# Patient Record
Sex: FEMALE | Race: WHITE | NOT HISPANIC OR LATINO | Employment: FULL TIME | URBAN - METROPOLITAN AREA
[De-identification: names, ages, dates, MRNs, and addresses within clinical notes are randomized per-mention and may not be internally consistent; named-entity substitution may affect disease eponyms.]

---

## 2017-04-27 ENCOUNTER — ALLSCRIPTS OFFICE VISIT (OUTPATIENT)
Dept: OTHER | Facility: OTHER | Age: 32
End: 2017-04-27

## 2017-07-14 ENCOUNTER — ALLSCRIPTS OFFICE VISIT (OUTPATIENT)
Dept: OTHER | Facility: OTHER | Age: 32
End: 2017-07-14

## 2017-07-20 ENCOUNTER — ALLSCRIPTS OFFICE VISIT (OUTPATIENT)
Dept: OTHER | Facility: OTHER | Age: 32
End: 2017-07-20

## 2017-07-20 DIAGNOSIS — Z34.00 ENCOUNTER FOR SUPERVISION OF NORMAL FIRST PREGNANCY: ICD-10-CM

## 2017-07-20 DIAGNOSIS — Z83.49 FAMILY HISTORY OF OTHER ENDOCRINE, NUTRITIONAL AND METABOLIC DISEASES: ICD-10-CM

## 2017-08-02 ENCOUNTER — TRANSCRIBE ORDERS (OUTPATIENT)
Dept: ADMINISTRATIVE | Age: 32
End: 2017-08-02

## 2017-08-02 ENCOUNTER — APPOINTMENT (OUTPATIENT)
Dept: LAB | Age: 32
End: 2017-08-02
Payer: COMMERCIAL

## 2017-08-02 DIAGNOSIS — Z34.00 SUPERVISION OF NORMAL FIRST PREGNANCY, ANTEPARTUM: Primary | ICD-10-CM

## 2017-08-02 DIAGNOSIS — Z34.00 SUPERVISION OF NORMAL FIRST PREGNANCY, ANTEPARTUM: ICD-10-CM

## 2017-08-02 DIAGNOSIS — Z00.8 HEALTH EXAMINATION IN POPULATION SURVEY: Primary | ICD-10-CM

## 2017-08-02 DIAGNOSIS — Z34.00 ENCOUNTER FOR SUPERVISION OF NORMAL FIRST PREGNANCY: ICD-10-CM

## 2017-08-02 DIAGNOSIS — Z00.8 HEALTH EXAMINATION IN POPULATION SURVEY: ICD-10-CM

## 2017-08-02 DIAGNOSIS — Z83.49 FAMILY HISTORY OF OTHER ENDOCRINE, NUTRITIONAL AND METABOLIC DISEASES: ICD-10-CM

## 2017-08-02 LAB
ABO GROUP BLD: NORMAL
BASOPHILS # BLD AUTO: 0.01 THOUSANDS/ΜL (ref 0–0.1)
BASOPHILS NFR BLD AUTO: 0 % (ref 0–1)
BILIRUB UR QL STRIP: NEGATIVE
BLD GP AB SCN SERPL QL: NEGATIVE
CHOLEST SERPL-MCNC: 188 MG/DL (ref 50–200)
CLARITY UR: NORMAL
COLOR UR: YELLOW
EOSINOPHIL # BLD AUTO: 0.07 THOUSAND/ΜL (ref 0–0.61)
EOSINOPHIL NFR BLD AUTO: 1 % (ref 0–6)
ERYTHROCYTE [DISTWIDTH] IN BLOOD BY AUTOMATED COUNT: 13.2 % (ref 11.6–15.1)
EST. AVERAGE GLUCOSE BLD GHB EST-MCNC: 97 MG/DL
GLUCOSE UR STRIP-MCNC: NEGATIVE MG/DL
HBA1C MFR BLD: 5 % (ref 4.2–6.3)
HBV SURFACE AG SER QL: NORMAL
HCT VFR BLD AUTO: 40.3 % (ref 34.8–46.1)
HDLC SERPL-MCNC: 59 MG/DL (ref 40–60)
HGB BLD-MCNC: 13.8 G/DL (ref 11.5–15.4)
HGB UR QL STRIP.AUTO: NEGATIVE
KETONES UR STRIP-MCNC: NEGATIVE MG/DL
LDLC SERPL CALC-MCNC: 105 MG/DL (ref 0–100)
LEUKOCYTE ESTERASE UR QL STRIP: NEGATIVE
LYMPHOCYTES # BLD AUTO: 1.49 THOUSANDS/ΜL (ref 0.6–4.47)
LYMPHOCYTES NFR BLD AUTO: 16 % (ref 14–44)
MCH RBC QN AUTO: 31 PG (ref 26.8–34.3)
MCHC RBC AUTO-ENTMCNC: 34.2 G/DL (ref 31.4–37.4)
MCV RBC AUTO: 91 FL (ref 82–98)
MONOCYTES # BLD AUTO: 0.63 THOUSAND/ΜL (ref 0.17–1.22)
MONOCYTES NFR BLD AUTO: 7 % (ref 4–12)
NEUTROPHILS # BLD AUTO: 7.27 THOUSANDS/ΜL (ref 1.85–7.62)
NEUTS SEG NFR BLD AUTO: 76 % (ref 43–75)
NITRITE UR QL STRIP: NEGATIVE
NRBC BLD AUTO-RTO: 0 /100 WBCS
PH UR STRIP.AUTO: 7.5 [PH] (ref 4.5–8)
PLATELET # BLD AUTO: 217 THOUSANDS/UL (ref 149–390)
PMV BLD AUTO: 10.9 FL (ref 8.9–12.7)
PROT UR STRIP-MCNC: NEGATIVE MG/DL
RBC # BLD AUTO: 4.45 MILLION/UL (ref 3.81–5.12)
RH BLD: POSITIVE
RUBV IGG SERPL IA-ACNC: >175 IU/ML
SP GR UR STRIP.AUTO: 1.02 (ref 1–1.03)
SPECIMEN EXPIRATION DATE: NORMAL
TRIGL SERPL-MCNC: 119 MG/DL
TSH SERPL DL<=0.05 MIU/L-ACNC: 2.77 UIU/ML (ref 0.36–3.74)
UROBILINOGEN UR QL STRIP.AUTO: 0.2 E.U./DL
WBC # BLD AUTO: 9.48 THOUSAND/UL (ref 4.31–10.16)

## 2017-08-02 PROCEDURE — 80081 OBSTETRIC PANEL INC HIV TSTG: CPT

## 2017-08-02 PROCEDURE — 80061 LIPID PANEL: CPT

## 2017-08-02 PROCEDURE — 83036 HEMOGLOBIN GLYCOSYLATED A1C: CPT

## 2017-08-02 PROCEDURE — 87086 URINE CULTURE/COLONY COUNT: CPT

## 2017-08-02 PROCEDURE — 36415 COLL VENOUS BLD VENIPUNCTURE: CPT

## 2017-08-02 PROCEDURE — 84443 ASSAY THYROID STIM HORMONE: CPT

## 2017-08-02 PROCEDURE — 81003 URINALYSIS AUTO W/O SCOPE: CPT

## 2017-08-03 LAB
BACTERIA UR CULT: NORMAL
HIV 1+2 AB+HIV1 P24 AG SERPL QL IA: NORMAL
RPR SER QL: NORMAL

## 2017-08-16 ENCOUNTER — LAB REQUISITION (OUTPATIENT)
Dept: LAB | Facility: HOSPITAL | Age: 32
End: 2017-08-16
Payer: COMMERCIAL

## 2017-08-16 ENCOUNTER — GENERIC CONVERSION - ENCOUNTER (OUTPATIENT)
Dept: OTHER | Facility: OTHER | Age: 32
End: 2017-08-16

## 2017-08-16 DIAGNOSIS — Z11.3 ENCOUNTER FOR SCREENING FOR INFECTIONS WITH PREDOMINANTLY SEXUAL MODE OF TRANSMISSION: ICD-10-CM

## 2017-08-16 DIAGNOSIS — Z34.90 ENCOUNTER FOR SUPERVISION OF NORMAL PREGNANCY: ICD-10-CM

## 2017-08-16 PROCEDURE — 87591 N.GONORRHOEAE DNA AMP PROB: CPT | Performed by: OBSTETRICS & GYNECOLOGY

## 2017-08-16 PROCEDURE — 87491 CHLMYD TRACH DNA AMP PROBE: CPT | Performed by: OBSTETRICS & GYNECOLOGY

## 2017-08-18 LAB
CHLAMYDIA DNA CVX QL NAA+PROBE: NORMAL
N GONORRHOEA DNA GENITAL QL NAA+PROBE: NORMAL

## 2017-08-26 ENCOUNTER — GENERIC CONVERSION - ENCOUNTER (OUTPATIENT)
Dept: OTHER | Facility: OTHER | Age: 32
End: 2017-08-26

## 2017-09-08 ENCOUNTER — ALLSCRIPTS OFFICE VISIT (OUTPATIENT)
Dept: PERINATAL CARE | Facility: CLINIC | Age: 32
End: 2017-09-08
Payer: COMMERCIAL

## 2017-09-08 ENCOUNTER — GENERIC CONVERSION - ENCOUNTER (OUTPATIENT)
Dept: OTHER | Facility: OTHER | Age: 32
End: 2017-09-08

## 2017-09-08 PROCEDURE — 76817 TRANSVAGINAL US OBSTETRIC: CPT | Performed by: OBSTETRICS & GYNECOLOGY

## 2017-09-08 PROCEDURE — 76811 OB US DETAILED SNGL FETUS: CPT | Performed by: OBSTETRICS & GYNECOLOGY

## 2017-09-12 ENCOUNTER — GENERIC CONVERSION - ENCOUNTER (OUTPATIENT)
Dept: OTHER | Facility: OTHER | Age: 32
End: 2017-09-12

## 2017-09-26 ENCOUNTER — GENERIC CONVERSION - ENCOUNTER (OUTPATIENT)
Dept: OTHER | Facility: OTHER | Age: 32
End: 2017-09-26

## 2017-10-10 ENCOUNTER — ALLSCRIPTS OFFICE VISIT (OUTPATIENT)
Dept: OTHER | Facility: OTHER | Age: 32
End: 2017-10-10

## 2017-10-10 DIAGNOSIS — R73.02 IMPAIRED GLUCOSE TOLERANCE: ICD-10-CM

## 2017-10-10 DIAGNOSIS — Z34.82 ENCOUNTER FOR SUPERVISION OF OTHER NORMAL PREGNANCY, SECOND TRIMESTER: ICD-10-CM

## 2017-11-04 ENCOUNTER — TRANSCRIBE ORDERS (OUTPATIENT)
Dept: ADMINISTRATIVE | Age: 32
End: 2017-11-04

## 2017-11-04 ENCOUNTER — APPOINTMENT (OUTPATIENT)
Dept: LAB | Age: 32
End: 2017-11-04
Payer: COMMERCIAL

## 2017-11-04 DIAGNOSIS — Z34.82 ENCOUNTER FOR SUPERVISION OF OTHER NORMAL PREGNANCY, SECOND TRIMESTER: ICD-10-CM

## 2017-11-04 LAB
ERYTHROCYTE [DISTWIDTH] IN BLOOD BY AUTOMATED COUNT: 13.6 % (ref 11.6–15.1)
GLUCOSE 1H P 50 G GLC PO SERPL-MCNC: 138 MG/DL
HCT VFR BLD AUTO: 39.2 % (ref 34.8–46.1)
HGB BLD-MCNC: 13 G/DL (ref 11.5–15.4)
MCH RBC QN AUTO: 30.5 PG (ref 26.8–34.3)
MCHC RBC AUTO-ENTMCNC: 33.2 G/DL (ref 31.4–37.4)
MCV RBC AUTO: 92 FL (ref 82–98)
PLATELET # BLD AUTO: 226 THOUSANDS/UL (ref 149–390)
PMV BLD AUTO: 11.5 FL (ref 8.9–12.7)
RBC # BLD AUTO: 4.26 MILLION/UL (ref 3.81–5.12)
WBC # BLD AUTO: 10.26 THOUSAND/UL (ref 4.31–10.16)

## 2017-11-04 PROCEDURE — 85027 COMPLETE CBC AUTOMATED: CPT

## 2017-11-04 PROCEDURE — 82950 GLUCOSE TEST: CPT

## 2017-11-04 PROCEDURE — 86592 SYPHILIS TEST NON-TREP QUAL: CPT

## 2017-11-04 PROCEDURE — 36415 COLL VENOUS BLD VENIPUNCTURE: CPT

## 2017-11-06 ENCOUNTER — GENERIC CONVERSION - ENCOUNTER (OUTPATIENT)
Dept: OTHER | Facility: OTHER | Age: 32
End: 2017-11-06

## 2017-11-06 LAB — RPR SER QL: NORMAL

## 2017-11-07 ENCOUNTER — GENERIC CONVERSION - ENCOUNTER (OUTPATIENT)
Dept: OTHER | Facility: OTHER | Age: 32
End: 2017-11-07

## 2017-11-11 ENCOUNTER — APPOINTMENT (OUTPATIENT)
Dept: LAB | Facility: HOSPITAL | Age: 32
End: 2017-11-11
Attending: OBSTETRICS & GYNECOLOGY
Payer: COMMERCIAL

## 2017-11-11 DIAGNOSIS — R73.02 IMPAIRED GLUCOSE TOLERANCE: ICD-10-CM

## 2017-11-11 LAB
GLUCOSE 1H P 100 G GLC PO SERPL-MCNC: 133 MG/DL (ref 65–179)
GLUCOSE 2H P 100 G GLC PO SERPL-MCNC: 129 MG/DL (ref 65–154)
GLUCOSE 3H P 100 G GLC PO SERPL-MCNC: 92 MG/DL (ref 65–139)
GLUCOSE P FAST SERPL-MCNC: 81 MG/DL (ref 65–99)

## 2017-11-11 PROCEDURE — 36415 COLL VENOUS BLD VENIPUNCTURE: CPT

## 2017-11-11 PROCEDURE — 82952 GTT-ADDED SAMPLES: CPT

## 2017-11-11 PROCEDURE — 82951 GLUCOSE TOLERANCE TEST (GTT): CPT

## 2017-11-13 ENCOUNTER — GENERIC CONVERSION - ENCOUNTER (OUTPATIENT)
Dept: OTHER | Facility: OTHER | Age: 32
End: 2017-11-13

## 2017-11-17 ENCOUNTER — GENERIC CONVERSION - ENCOUNTER (OUTPATIENT)
Dept: OTHER | Facility: OTHER | Age: 32
End: 2017-11-17

## 2017-12-13 ENCOUNTER — GENERIC CONVERSION - ENCOUNTER (OUTPATIENT)
Dept: OTHER | Facility: OTHER | Age: 32
End: 2017-12-13

## 2017-12-27 ENCOUNTER — GENERIC CONVERSION - ENCOUNTER (OUTPATIENT)
Dept: OTHER | Facility: OTHER | Age: 32
End: 2017-12-27

## 2017-12-27 ENCOUNTER — LAB REQUISITION (OUTPATIENT)
Dept: LAB | Facility: HOSPITAL | Age: 32
End: 2017-12-27
Payer: COMMERCIAL

## 2017-12-27 DIAGNOSIS — Z34.90 ENCOUNTER FOR SUPERVISION OF NORMAL PREGNANCY: ICD-10-CM

## 2017-12-27 PROCEDURE — 87653 STREP B DNA AMP PROBE: CPT | Performed by: PHYSICIAN ASSISTANT

## 2017-12-28 ENCOUNTER — OB ABSTRACT (OUTPATIENT)
Dept: LABOR AND DELIVERY | Facility: HOSPITAL | Age: 32
End: 2017-12-28

## 2017-12-28 PROBLEM — O26.859 SPOTTING IN PREGNANCY: Status: ACTIVE | Noted: 2017-12-28

## 2017-12-28 PROBLEM — R73.09 ABNORMAL GLUCOSE TOLERANCE TEST: Status: ACTIVE | Noted: 2017-12-28

## 2017-12-28 PROBLEM — Z34.82 ENCOUNTER FOR SUPERVISION OF OTHER NORMAL PREGNANCY, SECOND TRIMESTER: Status: ACTIVE | Noted: 2017-12-28

## 2017-12-29 LAB — GP B STREP DNA SPEC QL NAA+PROBE: NORMAL

## 2018-01-05 ENCOUNTER — GENERIC CONVERSION - ENCOUNTER (OUTPATIENT)
Dept: OTHER | Facility: OTHER | Age: 33
End: 2018-01-05

## 2018-01-09 ENCOUNTER — GENERIC CONVERSION - ENCOUNTER (OUTPATIENT)
Dept: OTHER | Facility: OTHER | Age: 33
End: 2018-01-09

## 2018-01-09 ENCOUNTER — APPOINTMENT (OUTPATIENT)
Dept: PERINATAL CARE | Facility: CLINIC | Age: 33
End: 2018-01-09
Payer: COMMERCIAL

## 2018-01-09 PROCEDURE — 76816 OB US FOLLOW-UP PER FETUS: CPT | Performed by: OBSTETRICS & GYNECOLOGY

## 2018-01-09 NOTE — PROGRESS NOTES
SEP 8 2017         RE: Juan Nolan                                 To: [facility]  MR#: [id number]                                   [address]   : AUG [address]   ENC: [id number]                             Lokesh, [address]   (Exam #: [id number])                           Fax: [phone]      The LMP of this 29year old,  G1, P0-0-0-0 patient was 2017, her   working CHON is 2018 and the current gestational age is 25 weeks 1   day by  [facility] [address]  A sonographic examination was performed on SEP   8 2017 using real time equipment  The ultrasound examination was performed   using abdominal & vaginal techniques  The patient has a BMI of 31 7  Her   blood pressure today was 108/61  Haris Hess presents today for a fetal anatomic evaluation  This is her first   pregnancy  She has no significant medical history  Her surgical history   is significant for wisdom teeth removal   She denies the current use of   tobacco, alcohol, or drugs  She currently takes a multivitamin, folic   acid, and Zyrtec when necessary  She has no significant drug allergies  Her family medical history is remarkable for thyroid disease in her family   and deafness in her great grandparents  A review of systems is otherwise   negative  On exam, the patient appears well, in no acute distress, and her   abdomen is nontender        Cardiac motion was observed at 134 bpm       INDICATIONS      fetal anatomical survey      Exam Types      Transvaginal   LEVEL II      RESULTS      Fetus # 1 of 1   Breech presentation   Fetal growth appeared normal   Placenta Location = Anterior   No placenta previa   Placenta Grade = I      MEASUREMENTS (* Included In Average GA)      AC              14 7 cm        19 weeks 5 days* (45%)   BPD              4 5 cm        19 weeks 4 days* (41%)   HC              16 7 cm        19 weeks 2 days* (27%)   Femur 2 9 cm        19 weeks 1 day * (21%)      Nuchal Fold      3 9 mm      Humerus          3 0 cm        19 weeks 6 days  (50%)      Cerebellum       2 0 cm        20 weeks 1 day   Biorbit          3 0 cm        19 weeks 4 days   CisternaMagna    4 4 mm      HC/AC           1 14   FL/AC           0 20   FL/BPD          0 65   EFW (Ac/Fl/Hc)   294 grams - 0 lbs 10 oz      THE AVERAGE GESTATIONAL AGE is 19 weeks 3 days +/- 10 days  AMNIOTIC FLUID         Largest Vertical Pocket = 4 4 cm   Amniotic Fluid: Normal      CERVICAL EVALUATION      SUPINE      Cervical Length: 3 90 cm      OTHER TEST RESULTS           Funneling?: No             Dynamic Changes?: No        Resp  To TFP?: No      ANATOMY      Head                                    Normal   Face/Neck                               Normal   Th  Cav  Normal   Heart                                   Normal   Abd  Cav  Normal   Stomach                                 Normal   Right Kidney                            Normal   Left Kidney                             Normal   Bladder                                 Normal   Abd  Wall                               Normal   Spine                                   Normal   Extrems                                 Normal   Genitalia                               Normal   Placenta                                Normal   Umbl  Cord                              Normal   Uterus                                  Normal   PCI                                     Normal      ANATOMY DETAILS      Visualized Appearing Sonographically Normal:   HEAD: (Calvarium, BPD Level, Cavum, Lateral Ventricles, Choroid Plexus,   Cerebellum, Cisterna Magna);    FACE/NECK: (Neck, Nuchal Fold, Profile,   Orbits, Nose/Lips, Palate, Face);    TH  CAV  : (Lungs, Diaphragm);       HEART: (Four Chamber View, Proximal Left Outflow, Proximal Right Outflow,   3VV, 3 Vessel Trachea, Short Nardin of Greater Vessels, Ductal Arch, Aortic   Arch, Interventricular Septum, Interatrial Septum, IVC, SVC, Cardiac Axis,   Cardiac Position);    ABD  CAV : (Liver);    STOMACH, RIGHT KIDNEY, LEFT   KIDNEY, BLADDER, ABD  WALL, SPINE: (Cervical Spine, Thoracic Spine, Lumbar   Spine, Sacrum);    EXTREMS: (Lt Humerus, Rt Humerus, Lt Forearm, Rt   Forearm, Lt Hand, Rt Hand, Lt Femur, Rt Femur, Lt Low Leg, Rt Low Leg, Lt   Foot, Rt Foot);    GENITALIA, PLACENTA, UMBL  CORD, UTERUS, PCI      ADNEXA      The left ovary appeared normal and measured 2 2 x 2 1 x 1 9 cm with a   volume of 4 6 cc  The right ovary appeared normal and measured 3 6 x 2 2 x   1 5 cm with a volume of 6 2 cc  IMPRESSION      Ybarra IUP   19 weeks and 3 days by this ultrasound  (CHON=JAN 30 2018)   20 weeks and 1 day by 1st Tri Sono  (CHON=JAN 25 2018)   Breech presentation   Fetal growth appeared normal   Normal anatomy survey   Regular fetal heart rate of 134 bpm   Anterior placenta   No placenta previa      GENERAL COMMENT         The patient has declined genetic screening, and we discussed that a normal   ultrasound does not exclude all congenital birth defects or karyotypic   abnormalities  The fetal anatomic survey is complete  There is no sonographic evidence   of fetal abnormalities at this time  Good fetal movement and tone are   seen  The amniotic fluid volume appears normal   The placenta is anterior   and it appears sonographically normal   A transvaginal ultrasound was   performed to assess the cervix, which was not seen well transabdominally  The cervical length was 3 9 centimeters, which is normal for the current   gestational age  There was no significant funneling or dynamic changes   appreciated  The patient was informed of today's findings and all of her   questions were answered  The limitations of ultrasound were reviewed with   the patient, which she accepts        Recommend further ultrasound as clinically indicated  Please note, in addition to the time spent discussing the results of the   ultrasound, I spent approximately 10 minutes of face-to-face time with the   patient, greater than 50% of which was spent in counseling and the   coordination of care for this patient  Thank you very much for allowing us to participate in the care of this   very nice patient  Should you have any questions, please do not hesitate   to contact our office  Front Flip, ANA Engle     Electronically signed 09/08/17 12:15

## 2018-01-10 ENCOUNTER — ANESTHESIA EVENT (INPATIENT)
Dept: LABOR AND DELIVERY | Facility: HOSPITAL | Age: 33
End: 2018-01-10
Payer: COMMERCIAL

## 2018-01-10 ENCOUNTER — ANESTHESIA (INPATIENT)
Dept: LABOR AND DELIVERY | Facility: HOSPITAL | Age: 33
End: 2018-01-10
Payer: COMMERCIAL

## 2018-01-10 ENCOUNTER — HOSPITAL ENCOUNTER (INPATIENT)
Facility: HOSPITAL | Age: 33
LOS: 3 days | Discharge: HOME | End: 2018-01-13
Attending: OBSTETRICS & GYNECOLOGY | Admitting: OBSTETRICS & GYNECOLOGY
Payer: COMMERCIAL

## 2018-01-10 DIAGNOSIS — E66.09 CLASS 2 OBESITY DUE TO EXCESS CALORIES WITHOUT SERIOUS COMORBIDITY IN ADULT: ICD-10-CM

## 2018-01-10 DIAGNOSIS — O42.90 PREMATURE RUPTURE OF MEMBRANES: ICD-10-CM

## 2018-01-10 DIAGNOSIS — Z3A.37 37 WEEKS GESTATION OF PREGNANCY: ICD-10-CM

## 2018-01-10 PROBLEM — O26.859 SPOTTING IN PREGNANCY: Status: RESOLVED | Noted: 2017-12-28 | Resolved: 2018-01-10

## 2018-01-10 PROBLEM — Z34.82 ENCOUNTER FOR SUPERVISION OF OTHER NORMAL PREGNANCY, SECOND TRIMESTER: Status: RESOLVED | Noted: 2017-12-28 | Resolved: 2018-01-10

## 2018-01-10 PROBLEM — R73.09 ABNORMAL GLUCOSE TOLERANCE TEST: Status: RESOLVED | Noted: 2017-12-28 | Resolved: 2018-01-10

## 2018-01-10 LAB
ABO GROUP BLD: NORMAL
BASOPHILS # BLD AUTO: 0.01 THOUSANDS/ΜL (ref 0–0.1)
BASOPHILS NFR BLD AUTO: 0 % (ref 0–1)
BLD GP AB SCN SERPL QL: NEGATIVE
EOSINOPHIL # BLD AUTO: 0.04 THOUSAND/ΜL (ref 0–0.61)
EOSINOPHIL NFR BLD AUTO: 0 % (ref 0–6)
ERYTHROCYTE [DISTWIDTH] IN BLOOD BY AUTOMATED COUNT: 13.8 % (ref 11.6–15.1)
HCT VFR BLD AUTO: 36.3 % (ref 34.8–46.1)
HGB BLD-MCNC: 12.5 G/DL (ref 11.5–15.4)
LYMPHOCYTES # BLD AUTO: 1.28 THOUSANDS/ΜL (ref 0.6–4.47)
LYMPHOCYTES NFR BLD AUTO: 14 % (ref 14–44)
MCH RBC QN AUTO: 29.8 PG (ref 26.8–34.3)
MCHC RBC AUTO-ENTMCNC: 34.4 G/DL (ref 31.4–37.4)
MCV RBC AUTO: 86 FL (ref 82–98)
MONOCYTES # BLD AUTO: 0.73 THOUSAND/ΜL (ref 0.17–1.22)
MONOCYTES NFR BLD AUTO: 8 % (ref 4–12)
NEUTROPHILS # BLD AUTO: 7.03 THOUSANDS/ΜL (ref 1.85–7.62)
NEUTS SEG NFR BLD AUTO: 78 % (ref 43–75)
NRBC BLD AUTO-RTO: 0 /100 WBCS
PLATELET # BLD AUTO: 217 THOUSANDS/UL (ref 149–390)
PMV BLD AUTO: 11.3 FL (ref 8.9–12.7)
RBC # BLD AUTO: 4.2 MILLION/UL (ref 3.81–5.12)
RH BLD: POSITIVE
SPECIMEN EXPIRATION DATE: NORMAL
WBC # BLD AUTO: 9.1 THOUSAND/UL (ref 4.31–10.16)

## 2018-01-10 PROCEDURE — 4A1HXCZ MONITORING OF PRODUCTS OF CONCEPTION, CARDIAC RATE, EXTERNAL APPROACH: ICD-10-PCS | Performed by: OBSTETRICS & GYNECOLOGY

## 2018-01-10 PROCEDURE — 86900 BLOOD TYPING SEROLOGIC ABO: CPT | Performed by: OBSTETRICS & GYNECOLOGY

## 2018-01-10 PROCEDURE — 86850 RBC ANTIBODY SCREEN: CPT | Performed by: OBSTETRICS & GYNECOLOGY

## 2018-01-10 PROCEDURE — 86901 BLOOD TYPING SEROLOGIC RH(D): CPT | Performed by: OBSTETRICS & GYNECOLOGY

## 2018-01-10 PROCEDURE — 86592 SYPHILIS TEST NON-TREP QUAL: CPT | Performed by: OBSTETRICS & GYNECOLOGY

## 2018-01-10 PROCEDURE — 85025 COMPLETE CBC W/AUTO DIFF WBC: CPT | Performed by: OBSTETRICS & GYNECOLOGY

## 2018-01-10 PROCEDURE — 99214 OFFICE O/P EST MOD 30 MIN: CPT

## 2018-01-10 PROCEDURE — 0KQM0ZZ REPAIR PERINEUM MUSCLE, OPEN APPROACH: ICD-10-PCS | Performed by: OBSTETRICS & GYNECOLOGY

## 2018-01-10 RX ORDER — BUPIVACAINE HYDROCHLORIDE 2.5 MG/ML
INJECTION, SOLUTION INFILTRATION; PERINEURAL AS NEEDED
Status: DISCONTINUED | OUTPATIENT
Start: 2018-01-10 | End: 2018-01-11 | Stop reason: SURG

## 2018-01-10 RX ORDER — FOLIC ACID 1 MG/1
1 TABLET ORAL DAILY
Status: DISCONTINUED | OUTPATIENT
Start: 2018-01-10 | End: 2018-01-11

## 2018-01-10 RX ORDER — METOCLOPRAMIDE HYDROCHLORIDE 5 MG/ML
5 INJECTION INTRAMUSCULAR; INTRAVENOUS EVERY 6 HOURS PRN
Status: DISCONTINUED | OUTPATIENT
Start: 2018-01-10 | End: 2018-01-11

## 2018-01-10 RX ORDER — FENTANYL CITRATE 50 UG/ML
INJECTION, SOLUTION INTRAMUSCULAR; INTRAVENOUS
Status: COMPLETED
Start: 2018-01-10 | End: 2018-01-10

## 2018-01-10 RX ORDER — ONDANSETRON 2 MG/ML
4 INJECTION INTRAMUSCULAR; INTRAVENOUS EVERY 4 HOURS PRN
Status: DISCONTINUED | OUTPATIENT
Start: 2018-01-10 | End: 2018-01-11

## 2018-01-10 RX ORDER — DIPHENHYDRAMINE HYDROCHLORIDE 50 MG/ML
25 INJECTION INTRAMUSCULAR; INTRAVENOUS EVERY 6 HOURS PRN
Status: DISCONTINUED | OUTPATIENT
Start: 2018-01-10 | End: 2018-01-11

## 2018-01-10 RX ORDER — FENTANYL CITRATE 50 UG/ML
INJECTION, SOLUTION INTRAMUSCULAR; INTRAVENOUS AS NEEDED
Status: DISCONTINUED | OUTPATIENT
Start: 2018-01-10 | End: 2018-01-11 | Stop reason: SURG

## 2018-01-10 RX ORDER — SODIUM CHLORIDE, SODIUM LACTATE, POTASSIUM CHLORIDE, CALCIUM CHLORIDE 600; 310; 30; 20 MG/100ML; MG/100ML; MG/100ML; MG/100ML
125 INJECTION, SOLUTION INTRAVENOUS CONTINUOUS
Status: DISCONTINUED | OUTPATIENT
Start: 2018-01-10 | End: 2018-01-11

## 2018-01-10 RX ORDER — OXYTOCIN/RINGER'S LACTATE 30/500 ML
PLASTIC BAG, INJECTION (ML) INTRAVENOUS
Status: DISPENSED
Start: 2018-01-10 | End: 2018-01-11

## 2018-01-10 RX ORDER — OXYTOCIN/RINGER'S LACTATE 30/500 ML
1-30 PLASTIC BAG, INJECTION (ML) INTRAVENOUS
Status: DISCONTINUED | OUTPATIENT
Start: 2018-01-10 | End: 2018-01-11

## 2018-01-10 RX ADMIN — SODIUM CHLORIDE, SODIUM LACTATE, POTASSIUM CHLORIDE, AND CALCIUM CHLORIDE 125 ML/HR: .6; .31; .03; .02 INJECTION, SOLUTION INTRAVENOUS at 20:35

## 2018-01-10 RX ADMIN — SODIUM CHLORIDE, SODIUM LACTATE, POTASSIUM CHLORIDE, AND CALCIUM CHLORIDE 999 ML/HR: .6; .31; .03; .02 INJECTION, SOLUTION INTRAVENOUS at 17:52

## 2018-01-10 RX ADMIN — FENTANYL CITRATE 10 MCG: 50 INJECTION, SOLUTION INTRAMUSCULAR; INTRAVENOUS at 18:11

## 2018-01-10 RX ADMIN — BUPIVACAINE HYDROCHLORIDE 1 ML: 2.5 INJECTION, SOLUTION INFILTRATION; PERINEURAL at 18:11

## 2018-01-10 RX ADMIN — SODIUM CHLORIDE, SODIUM LACTATE, POTASSIUM CHLORIDE, AND CALCIUM CHLORIDE 125 ML/HR: .6; .31; .03; .02 INJECTION, SOLUTION INTRAVENOUS at 12:00

## 2018-01-10 RX ADMIN — Medication 2 MILLI-UNITS/MIN: at 14:00

## 2018-01-10 RX ADMIN — SODIUM CHLORIDE, SODIUM LACTATE, POTASSIUM CHLORIDE, AND CALCIUM CHLORIDE 125 ML/HR: .6; .31; .03; .02 INJECTION, SOLUTION INTRAVENOUS at 23:06

## 2018-01-10 RX ADMIN — FOLIC ACID 1 MG: 1 TABLET ORAL at 14:00

## 2018-01-10 RX ADMIN — Medication 1 TABLET: at 12:22

## 2018-01-10 RX ADMIN — Medication: at 18:15

## 2018-01-10 NOTE — H&P
H&P Exam - Obstetrics   Yanet Quintana 29 y o  female MRN: [id number]  Unit/Bed#: -01 Encounter: [id number]    Assessment/Plan     Assessment:  37 weeks gestation of pregnancy  Premature rupture of membranes    Plan:  1  Admit  2  Expectant management, GBS negative    History of Present Illness   Chief Complaint: Leakage of fluid this AM    HPI:  Yanet Quintana is a 29 y o   female with an CHON of 2018, by Ultrasound at 37w6d weeks gestation who is being admitted for rupture of membranes  Contractions: mild cramping  Leakage of fluid: 530am small leak, continued an hour later, clear  No odor  Bleeding: None  Fetal movement: present  Pregnancy complications: Obesity, abnormal one hour glucola (normal 3hr)    Review of Systems   Constitutional: Negative for activity change, appetite change, chills, fatigue and fever  HENT: Negative for rhinorrhea, sneezing and sore throat  Eyes: Negative for visual disturbance  Respiratory: Negative for cough, shortness of breath and wheezing  Cardiovascular: Negative for chest pain, palpitations and leg swelling  Gastrointestinal: Negative for abdominal distention, constipation, diarrhea, nausea and vomiting  Genitourinary: Negative for difficulty urinating  Neurological: Negative for syncope and light-headedness          Historical Information   OB History    Para Term  AB Living   1 0 0 0 0 0   SAB TAB Ectopic Multiple Live Births   0 0 0 0 0      # Outcome Date GA Lbr [place]/2nd Weight Sex Delivery Anes PTL Lv   1                  Baby complications/comments: None  Past Medical History:   Diagnosis Date    Abnormal Pap smear of cervix     Chronic kidney disease     history of kidney stones, Occ UTI's    Eczema     Skin disease     atypical pre cancerous skin cells, removed at age 25    Varicella     childhood     Past Surgical History:   Procedure Laterality Date    COLPOSCOPY       Social History   History   Alcohol Use No     Comment: Socially     History   Drug Use No     History   Smoking Status    Never Smoker   Smokeless Tobacco    Never Used     Family History   Problem Relation Age of Onset    Gestational diabetes Paternal Grandmother     Hypertension Paternal Grandmother     Hypothyroidism Mother     Hypertension Maternal Grandmother     Hypothyroidism Maternal Grandmother     Cancer Maternal Grandfather     COPD Maternal Grandfather     Hypertension Maternal Grandfather     Hypothyroidism Maternal Aunt     Cancer Paternal Aunt        Meds/Allergies   All medications and allergies reviewed  No Known Allergies    Objective   Vitals: Blood pressure 119/74, pulse 75, temperature 98 4 °F (36 9 °C), temperature source Oral, resp  rate 16, height 5' 1" (1 549 m), weight 86 2 kg (190 lb), last menstrual period 04/28/2017, unknown if currently breastfeeding  Body mass index is 35 9 kg/m²  Invasive Devices          No matching active lines, drains, or airways          Physical Exam   Constitutional: She is oriented to person, place, and time  She appears well-developed and well-nourished  No distress  Cardiovascular: Normal rate, regular rhythm and normal heart sounds  Exam reveals no gallop and no friction rub  No murmur heard  Pulmonary/Chest: Effort normal and breath sounds normal  No respiratory distress  Abdominal: Soft  Bowel sounds are normal  She exhibits no distension  There is no tenderness  There is no rebound and no guarding  Neurological: She is alert and oriented to person, place, and time  She has normal reflexes  Skin: She is not diaphoretic  Cervix 3/80/0 mid soft  NST CAT 1  TOCO q 4-5min    Prenatal Labs: I have personally reviewed pertinent reports  Blood type A+_  Glucola 138 (3 hr 81, 133,129,92)  HIV neg  RPR NR  Rubella Immune  GBS Negative    Imaging, Pathology, and Other Studies: I have personally reviewed pertinent reports

## 2018-01-10 NOTE — ANESTHESIA PROCEDURE NOTES
CSE Block    Patient location during procedure: OB  Start time: 1/10/2018 6:11 PM  Reason for block: procedure for pain and at surgeon's request  Staffing  Anesthesiologist: Anny Hubbard  Performed: anesthesiologist   Preanesthetic Checklist  Completed: patient identified, site marked, surgical consent, pre-op evaluation, timeout performed, IV checked, risks and benefits discussed and monitors and equipment checked  CSE  Patient position: sitting  Prep: ChloraPrep  Patient monitoring: cardiac monitor and continuous pulse ox  Approach: midline  Spinal Needle  Needle type: pencil-tip   Needle gauge: 27 G  Needle length: 10 cm  Epidural Needle  Injection technique: BERNIE saline  Needle type: Tuohy   Needle gauge: 18 G  Location: lumbar (1-5)  Catheter  Catheter type: side hole  Catheter size: 20 G  Catheter at skin depth: 12 cm  Test dose: negative  AssessmentInjection Assessment:  negative aspiration for heme, no paresthesia on injection, positive aspiration for clear CSF and no pain on injection

## 2018-01-10 NOTE — ANESTHESIA PREPROCEDURE EVALUATION
Review of Systems/Medical History  Patient summary reviewed  Chart reviewed      Cardiovascular  Negative cardio ROS    Pulmonary  Negative pulmonary ROS ,        GI/Hepatic  Negative GI/hepatic ROS          Kidney stones,        Endo/Other  Negative endo/other ROS      GYN  Currently pregnant , Prior pregnancy/OB history : 1 Parity: 0,          Hematology  Negative hematology ROS      Musculoskeletal  Obesity ,        Neurology  Negative neurology ROS      Psychology   Negative psychology ROS            Physical Exam    Airway    Mallampati score: II  TM Distance: >3 FB  Neck ROM: full     Dental   No notable dental hx     Cardiovascular  Comment: Negative ROS, Rhythm: regular, Cardiovascular exam normal    Pulmonary  Pulmonary exam normal Breath sounds clear to auscultation,     Other Findings        Anesthesia Plan  ASA Score- 2     Anesthesia Type- epidural and spinal with ASA Monitors  Additional Monitors:   Airway Plan:         Plan Factors-    Induction-     Postoperative Plan-     Informed Consent- Anesthetic plan and risks discussed with patient  Recent labs personally reviewed:  Lab Results   Component Value Date    WBC 9 10 01/10/2018    HGB 12 5 01/10/2018     01/10/2018     No results found for: NA, K, BUN, CREATININE, GLUCOSE  No results found for: PTT   No results found for: INR    Blood type A    Lab Results   Component Value Date    HGBA1C 5 0 2017       I, Carmen Pepe MD, have personally seen and evaluated the patient prior to anesthetic care  I have reviewed the pre-anesthetic record, and other medical records if appropriate to the anesthetic care  If a CRNA is involved in the case, I have reviewed the CRNA assessment, if present, and agree  Risks/benefits and alternatives discussed with patient including possible PONV, sore throat, and possibility of rare anesthetic and surgical emergencies

## 2018-01-10 NOTE — OB LABOR/OXYTOCIN SAFETY PROGRESS
Oxytocin Safety Progress Check Note - Erica Guevara 29 y o  female MRN: [id number]    Unit/Bed#: -01 Encounter: [id number]    Obstetric History       T0      L0     SAB0   TAB0   Ectopic0   Multiple0   Live Births0      Gestational Age: 37w6d  Dose (orlin-units/min) Oxytocin: 6 orlin-units/min  Contraction Frequency (minutes): 2-3  Contraction Quality: Moderate  Tachysystole: No   Dilation: 3        Effacement (%): 80  Station: 1  Baseline Rate: 135 bpm  Fetal Heart Rate: 132 BPM  FHR Category: Category I          Notes/comments: Continue present management            Chinedu Resendez MD 1/10/2018 3:38 PM

## 2018-01-10 NOTE — OB LABOR/OXYTOCIN SAFETY PROGRESS
Oxytocin Safety Progress Check Note - Alicia Jolley 29 y o  female MRN: [id number]    Unit/Bed#: -01 Encounter: [id number]    Obstetric History       T0      L0     SAB0   TAB0   Ectopic0   Multiple0   Live Births0      Gestational Age: 41w10d  Dose (orlin-units/min) Oxytocin: 10 orlin-units/min  Contraction Frequency (minutes): 1-2  Contraction Quality: Moderate  Tachysystole: No   Dilation: 4        Effacement (%): 80  Station: 1  Baseline Rate: 130 bpm  Fetal Heart Rate: 160 BPM  FHR Category: Category I     Oxytocin Safety Progress Check: Safety check completed    Notes/comments:   Patient requesting epidural  SVE 4/80/+1  Category I FHT  Pitocin at 10  Recheck in 2 hours, sooner if indicated   OK for epidural    D/W Dr Michelet Gotti MD 1/10/2018 5:42 PM

## 2018-01-10 NOTE — OB LABOR/OXYTOCIN SAFETY PROGRESS
Labor Progress Note - Kaela Hughes 29 y o  female MRN: [id number]    Unit/Bed#: -01 Encounter: [id number]    Obstetric History       T0      L0     SAB0   TAB0   Ectopic0   Multiple0   Live Births0      Gestational Age: 41w10d     Contraction Frequency (minutes): 1-3  Contraction Quality: Mild  Tachysystole: No   Dilation: 3        Effacement (%): 80  Station: 0  Baseline Rate: 140 bpm  FHR Category: Category I     Notes/comments:   Patient remains comfortable- not feeling contractions  No cervical change at this time  FHT remains cat I with mild CTX Q 1-3 minutes  Will begin pitocin titration 2x2 Q 30 minutes per nursing protocol and monitor closely  Discussed risks, benefits and alternatives with augmentation to patient and she is agreeable      D/w Dr Tori Myers MD 1/10/2018 1:43 PM

## 2018-01-11 LAB
BASE EXCESS BLDCOA CALC-SCNC: -5.8 MMOL/L (ref 3–11)
BASE EXCESS BLDCOV CALC-SCNC: -5.5 MMOL/L (ref 1–9)
HCO3 BLDCOA-SCNC: 20.9 MMOL/L (ref 17.3–27.3)
HCO3 BLDCOV-SCNC: 18 MMOL/L (ref 12.2–28.6)
O2 CT VFR BLDCOA CALC: 15.6 ML/DL
OXYHGB MFR BLDCOA: 69.6 %
OXYHGB MFR BLDCOV: 79.7 %
PCO2 BLDCOA: 45.3 MM[HG] (ref 30–60)
PCO2 BLDCOV: 30.5 MM HG (ref 27–43)
PH BLDCOA: 7.28 [PH] (ref 7.23–7.43)
PH BLDCOV: 7.39 [PH] (ref 7.19–7.49)
PO2 BLDCOA: 34.4 MM HG (ref 5–25)
PO2 BLDCOV: 36.5 MM HG (ref 15–45)
RPR SER QL: NORMAL
SAO2 % BLDCOV: 17.7 ML/DL

## 2018-01-11 PROCEDURE — 82805 BLOOD GASES W/O2 SATURATION: CPT | Performed by: OBSTETRICS & GYNECOLOGY

## 2018-01-11 RX ORDER — ACETAMINOPHEN 325 MG/1
650 TABLET ORAL ONCE
Status: COMPLETED | OUTPATIENT
Start: 2018-01-11 | End: 2018-01-11

## 2018-01-11 RX ORDER — DIAPER,BRIEF,INFANT-TODD,DISP
1 EACH MISCELLANEOUS AS NEEDED
Status: DISCONTINUED | OUTPATIENT
Start: 2018-01-11 | End: 2018-01-13 | Stop reason: HOSPADM

## 2018-01-11 RX ORDER — ONDANSETRON 2 MG/ML
4 INJECTION INTRAMUSCULAR; INTRAVENOUS EVERY 8 HOURS PRN
Status: DISCONTINUED | OUTPATIENT
Start: 2018-01-11 | End: 2018-01-13 | Stop reason: HOSPADM

## 2018-01-11 RX ORDER — IBUPROFEN 600 MG/1
600 TABLET ORAL EVERY 6 HOURS PRN
Status: DISCONTINUED | OUTPATIENT
Start: 2018-01-11 | End: 2018-01-13 | Stop reason: HOSPADM

## 2018-01-11 RX ORDER — DIPHENHYDRAMINE HCL 25 MG
25 TABLET ORAL EVERY 6 HOURS PRN
Status: DISCONTINUED | OUTPATIENT
Start: 2018-01-11 | End: 2018-01-13 | Stop reason: HOSPADM

## 2018-01-11 RX ORDER — CALCIUM CARBONATE 200(500)MG
1000 TABLET,CHEWABLE ORAL DAILY PRN
Status: DISCONTINUED | OUTPATIENT
Start: 2018-01-11 | End: 2018-01-13 | Stop reason: HOSPADM

## 2018-01-11 RX ORDER — ACETAMINOPHEN 325 MG/1
650 TABLET ORAL EVERY 6 HOURS PRN
Status: DISCONTINUED | OUTPATIENT
Start: 2018-01-11 | End: 2018-01-13 | Stop reason: HOSPADM

## 2018-01-11 RX ORDER — OXYCODONE HYDROCHLORIDE AND ACETAMINOPHEN 5; 325 MG/1; MG/1
1 TABLET ORAL EVERY 4 HOURS PRN
Status: DISCONTINUED | OUTPATIENT
Start: 2018-01-11 | End: 2018-01-13 | Stop reason: HOSPADM

## 2018-01-11 RX ORDER — DOCUSATE SODIUM 100 MG/1
100 CAPSULE, LIQUID FILLED ORAL 2 TIMES DAILY
Status: DISCONTINUED | OUTPATIENT
Start: 2018-01-11 | End: 2018-01-13 | Stop reason: HOSPADM

## 2018-01-11 RX ADMIN — BENZOCAINE AND MENTHOL: 20; .5 SPRAY TOPICAL at 09:47

## 2018-01-11 RX ADMIN — DOCUSATE SODIUM 100 MG: 100 CAPSULE, LIQUID FILLED ORAL at 18:30

## 2018-01-11 RX ADMIN — HYDROCORTISONE 1 APPLICATION: 1 CREAM TOPICAL at 09:47

## 2018-01-11 RX ADMIN — WITCH HAZEL 1 PAD: 500 SOLUTION RECTAL; TOPICAL at 09:47

## 2018-01-11 RX ADMIN — ACETAMINOPHEN 650 MG: 325 TABLET, FILM COATED ORAL at 02:20

## 2018-01-11 RX ADMIN — IBUPROFEN 600 MG: 600 TABLET ORAL at 09:36

## 2018-01-11 RX ADMIN — IBUPROFEN 600 MG: 600 TABLET ORAL at 20:38

## 2018-01-11 RX ADMIN — SODIUM CHLORIDE, SODIUM LACTATE, POTASSIUM CHLORIDE, AND CALCIUM CHLORIDE 1000 ML: .6; .31; .03; .02 INJECTION, SOLUTION INTRAVENOUS at 02:35

## 2018-01-11 RX ADMIN — DOCUSATE SODIUM 100 MG: 100 CAPSULE, LIQUID FILLED ORAL at 09:36

## 2018-01-11 NOTE — L&D DELIVERY NOTE
DELIVERY NOTE  Jovanny Velazquez 29 y o  female MRN: [id number]  Unit/Bed#: -01 Encounter: [id number]    Obstetrician:   Bridgette Espino    Assistant: Pablito Medina    Pre-Delivery Diagnosis: Term pregnancy ; Term PROM    Post-Delivery Diagnosis: Same as above - Delivered or 2nd degree laceration and periurethral lacerations    Procedure:  ; repair of second degree perineal laceration    Indications for instrumental delivery: none    Estimated Blood Loss:  095            Complications:  None    Description of Delivery: Patient delivered a viable Female   The fetal position was noted to be DERICK  A nuchal cord was not noted  With the assistance of maternal expulsive efforts and downward traction of fetal head, the anterior shoulder was delivered without difficulty, followed by the remainder of the infant's body  After delivery of the , the umbilical cord was doubly clamped and cut and the  was passed off to  staff for routine care  Umbilical cord blood and umbilical artery and venous gases were collected  Placenta was delivered with fundal massage and gentle traction on the cord with active management of the third stage of labor  Placenta delivered intact with a 3-vessel cord  Active management of the third stage of labor was undertaken with IV pitocin  Inspection of the perineum, vagina, labia, and urethra revealed a second degree perineal laceration left of the patient's midline that was repaired in standard fashion using 3-0 Vicryl suture  There was also noted to be a superficial area of bleeding with friable tissues on the perineum left of the midline which was repaired with a single figure of eight suture of 3-0 vicryl  There were noted to be superficial periurethral lacerations which were hemostatic and did not require repair  The lacerations showed good tissue reapproximation and hemostasis      Intrauterine examination revealed small clots at the the lower uterine segment which were removed  The uterus was palpated at the fundus and noted to be firm  Vaginal bleeding was noted to be stable  The perineal lacerations were again inspected and noted to be hemostatic  Mother and baby are currently recovering nicely in stable condition      APGARS: 9 at one minute, 9 at 5 minutes  Blood gases: Arterial pH: 7 282, Base excess: -5 8 ; Venous pH:7 390, Base excess: -5 5

## 2018-01-11 NOTE — OB LABOR/OXYTOCIN SAFETY PROGRESS
Oxytocin Safety Progress Check Note - Erica Guevara 29 y o  female MRN: [id number]    Unit/Bed#: -01 Encounter: [id number]    Obstetric History       T0      L0     SAB0   TAB0   Ectopic0   Multiple0   Live Births0      Gestational Age: 41w10d  Dose (orlin-units/min) Oxytocin: 14 orlin-units/min  Contraction Frequency (minutes): 1 5-2  Contraction Quality: Moderate  Tachysystole: Yes   Dilation: 10  Dilation Complete Date: 01/10/18  Dilation Complete Time: 2300  Effacement (%): 100  Station: 1  Baseline Rate: 140 bpm  Fetal Heart Rate: 140 BPM  FHR Category: Category II     Oxytocin Safety Progress Check: Safety check completed    Notes/comments:     SAFETY HUDDLE    TRIGGER: tachysystole    PARTICIPANTS: Peyton Sanford (charge RN), Dana (bedside RN), Naa Dejesus (bedside RN), Rachelle Greene MD (resident), Joana Llanos MD    REVIEW OF CURRENT PLAN OF CARE AND MANAGEMENT: Patient with tachysystole over the last 30 minutes  Pitocin reduced to 14 from 18  Will monitor for improvement in FHR pattern  FHR category 2 for 2 late decelerations which have now resolved        TIMELINE FOR NEXT ASSESSMENT: 30-60 minutes    ALL PARTICIPANTS IN AGREEMENT WITH PLAN OF CARE: yes    IS PERRT REQUIRED: no         Provider Name: Cass Peraza MD 1/10/2018 11:16 PM

## 2018-01-11 NOTE — MISCELLANEOUS
Message   Recorded as Task   Date: 09/26/2017 09:06 AM, Created By: Maryann Sacks   Task Name: Follow Up   Assigned To: Cristina Truong   Regarding Patient: Erica Uriarte, Status: In Progress   Comment:    Daniela Jones - 26 Sep 2017 9:06 AM     TASK CREATED  Caller: Self; General Medical Question; [phone] (Home)  Pt calling to ask if it is ok to get hair highlighted  Ok to [facility] per pt   Anni Ramon - 26 Sep 2017 9:16 AM     TASK IN PROGRESS   Anni Ramon - 26 Sep 2017 9:33 AM     TASK EDITED  MA contacted Pt via phone call today  Pt states she has a function to attend, wants to know if it is okay to have hair highlighted  MA informed Pt that MA addressed Pt's question with OB Triage RN Zeenat Mata MA further informed Pt that per KAREN Mata, Pt's hair can be highlighted using foils and/or a cap  MA informed Pt , however, that hair color should not be applied directly to scalp  Pt verbalized understanding of MA's instructions regarding getting hair highlighted during pregnancy  MA reiterated to Pt that if she has any questions/concerns, to contact office  Active Problems    1  Encounter for supervision of other normal pregnancy in second trimester (V22 1)   (Z34 82)   2  Spotting in pregnancy (649 50) (O26 859)    Current Meds   1  Daily Value Multivitamin TABS; Therapy: ([place] [facility]) to Recorded   2  Folic Acid TABS; Therapy: (Recorded:[id number]) to Recorded    Allergies    1  No Known Drug Allergies    2  No Known Environmental Allergies   3   No Known Food Allergies    Signatures   Electronically signed by : Missy Goncalves MA; Sep 26 2017  9:33AM EST                       (Author)

## 2018-01-11 NOTE — DISCHARGE SUMMARY
Discharge Summary - OB/GYN  Jovanny Velazquez 29 y o  female MRN: [id number]  Unit/Bed#: -01 Encounter: [id number]    Admission Date: 1/10/2018     Discharge Date:18    Delivery Attending: Mo Lara MD   Discharge Attending: Dr Rupa Nicole    Principal Diagnosis: Pregnancy at 37w6d    Secondary Diagnosis: Term PROM    Procedures: spontaneous vaginal delivery ; repair of second degree perineal laceration    Anesthesia: epidural    Hospital course: Jovanny Velazquez is a 29 y o   who was initially admitted on 1/10/18 at 37w6d for term PROM  Labor was induced with Pitocin  She progressed to complete cervical dilation and delivered a viable female  on 17 at 01:08  Weight 6lbs 12oz via normal spontaneous vaginal delivery  She sustained a 2nd degree perineal laceration during delivery which was adequately repaired ; she also sustained bilateral periurethral abrasions which did not require repair  Apgars were 9 (1 min) and 9 (5 min)   was transferred to  nursery  Patient tolerated the procedure well  Her post-delivery course was uncomplicated  Her postpartum pain was well controlled with oral analgesics  On day of discharge, she was ambulating and able to reasonably perform all ADLs  She was voiding and had appropriate bowel function  Pain was well controlled  She was discharged home on postpartum day #2 without complications  Patient was instructed to follow up with her OB as an outpatient and was given appropriate warnings to call provider if she develops signs of infection or uncontrolled pain  Complications: none apparent    Condition at discharge: stable     Discharge instructions/Information to patient and family:   See after visit summary for information provided to patient and family  Provisions for Follow-Up Care:  See after visit summary for information related to follow-up care and any pertinent home health orders        Disposition: See After Visit Summary for discharge disposition information  Planned Readmission: No    Discharge Medications: For a complete list of the patient's medications, please refer to her med rec

## 2018-01-11 NOTE — OB LABOR/OXYTOCIN SAFETY PROGRESS
Oxytocin Safety Progress Check Note - Rylee Tovar 29 y o  female MRN: [id number]    Unit/Bed#: -01 Encounter: [id number]    Obstetric History       T0      L0     SAB0   TAB0   Ectopic0   Multiple0   Live Births0      Gestational Age: 41w10d  Dose (orlin-units/min) Oxytocin: 14 orlin-units/min  Contraction Frequency (minutes): 2-3, irregular  Contraction Quality: Mild  Tachysystole: No   Dilation: 5-6        Effacement (%): 100  Station: 1  Baseline Rate: 135 bpm  Fetal Heart Rate: 128 BPM  FHR Category: Category II     Oxytocin Safety Progress Check: Safety check completed    Notes/comments:   Making cervical change, SVE 5-6/100/+1  Periods of Cat II FHT for nonrecurrent lates which resolve with maternal repositioning  Pitocin at 14, irregular contraction pattern  Continue to titrate Pitocin, recheck in 2 hours, sooner if indicated    Dr America Wilburn aware            Maxi Mcconnell MD 1/10/2018 8:36 PM

## 2018-01-11 NOTE — LACTATION NOTE
This note was copied from a baby's chart  CONSULT - LACTATION  Baby Girl Georgina Fraction 0 days female MRN: [id number]    [facility] Room / Bed: (N)/(N) Encounter: [id number]    Maternal Information     MOTHER:  Kimberlyn Jackson  Maternal Age: 29 y o    OB History: #: 1, Date: 18, Sex: Female, Weight: 3070 g (6 lb 12 3 oz), GA: 38w0d, Delivery: Vaginal, Spontaneous Delivery, Apgar1: 9, Apgar5: 9, Living: Living, Birth Comments: None       Lactation history:   Has patient previously breast fed: No   How long had patient previously breast fed:     Previous breast feeding complications:       Past Surgical History:   Procedure Laterality Date    COLPOSCOPY         Birth information:  YOB: 2016   Time of birth: 4:56 AM   Sex: female   Delivery type: Vaginal, Spontaneous Delivery   Birth Weight: 3070 g (6 lb 12 3 oz)   Percent of Weight Change: 0%     Gestational Age: 38w0d   [unfilled]    Assessment     Breast and nipple assessment: not assessed at this time   Assessment: sleepy and not assessed at this time  Feeding assessment: no latch  LATCH:  Latch: Audible Swallowing:     Type of Nipple:     Comfort (Breast/Nipple):     Hold (Positioning):     LATCH Score:            Feeding recommendations:  breast feed on demand     Met with mother  Provided mother with Ready, Set, Baby booklet  Discussed Skin to Skin contact an benefits to mom and baby  Talked about the delay of the first bath until baby has adjusted  Spoke about the benefits of rooming in  Feeding on cue and what that means for recognizing infant's hunger  Avoidance of pacifiers for the first month discussed  Talked about exclusive breastfeeding for the first 6 months  Positioning and latch reviewed as well as showing images of other feeding positions  Discussed the properties of a good latch in any position  Reviewed hand/manual expression  Discussed s/s that baby is getting enough milk and some s/s that breastfeeding dyad may need further help  Gave information on common concerns, what to expect the first few weeks after delivery, preparing for other caregivers, and how partners can help  Resources for support also provided  Information on hand expression given  Discussed benefits of knowing how to manually express breast including stimulating milk supply, softening nipple for latch and evacuating breast in the event of engorgement  [employer] MOB and FOB to call for assistance, questions and concerns  Extension number for inpatient lactation support provided      Yuliana Lyon RN 1/11/2018 2:20 PM

## 2018-01-12 RX ADMIN — IBUPROFEN 600 MG: 600 TABLET ORAL at 09:00

## 2018-01-12 RX ADMIN — DOCUSATE SODIUM 100 MG: 100 CAPSULE, LIQUID FILLED ORAL at 09:00

## 2018-01-12 RX ADMIN — DOCUSATE SODIUM 100 MG: 100 CAPSULE, LIQUID FILLED ORAL at 18:02

## 2018-01-12 NOTE — PLAN OF CARE
ALTERATION IN THE BREAST     Optimize infant feeding at the breast Progressing        DISCHARGE PLANNING     Discharge to home or other facility with appropriate resources Progressing        INADEQUATE LATCH, SUCK OR SWALLOW     Demonstrate ability to latch and sustain latch, audible swallowing and satiety Progressing        INFECTION - ADULT     Absence or prevention of progression during hospitalization Progressing     Absence of fever/infection during neutropenic period Progressing        Knowledge Deficit     Patient/family/caregiver demonstrates understanding of disease process, treatment plan, medications, and discharge instructions Progressing        PAIN - ADULT     Verbalizes/displays adequate comfort level or baseline comfort level Progressing        POSTPARTUM     Experiences normal postpartum course Progressing     Appropriate maternal -  bonding Progressing     Establishment of infant feeding pattern Progressing     Incision(s), wounds(s) or drain site(s) healing without S/S of infection Progressing        SAFETY ADULT     Patient will remain free of falls Progressing     Maintain or return to baseline ADL function Progressing     Maintain or return mobility status to optimal level Progressing

## 2018-01-12 NOTE — DISCHARGE INSTRUCTIONS
Vaginal Delivery   WHAT YOU NEED TO KNOW:   A vaginal delivery occurs when your baby is born through your vagina (birth canal)  DISCHARGE INSTRUCTIONS:   Seek care immediately if:   · Your leg feels warm, tender, and painful  It may look swollen and red  · You have a fever  · You are urinating very little, or not at all  · You have heavy vaginal bleeding that fills 1 or more sanitary pads in 1 hour  · You feel weak, dizzy, or faint  Contact your healthcare provider if:   · Your abdominal or perineal pain does not go away, or gets worse  · You feel depressed  · You have questions or concerns about your condition or care  Medicines:  · NSAIDs , such as ibuprofen, help decrease swelling, pain, and fever  This medicine is available with or without a doctor's order  NSAIDs can cause stomach bleeding or kidney problems in certain people  If you take blood thinner medicine, always ask your healthcare provider if NSAIDs are safe for you  Always read the medicine label and follow directions  · Stool softeners  make it easier for you to have a bowel movement  You may need this medicine to treat or prevent constipation  · Take your medicine as directed  Contact your healthcare provider if you think your medicine is not helping or if you have side effects  Tell him or her if you are allergic to any medicine  Keep a list of the medicines, vitamins, and herbs you take  Include the amounts, and when and why you take them  Bring the list or the pill bottles to follow-up visits  Carry your medicine list with you in case of an emergency  Follow up with your healthcare provider:  Most women need to return 6 weeks after a vaginal delivery  Ask your healthcare provider how to care for your wounds or stitches, if you have them  Write down your questions so you remember to ask them during your visits  Activity:  Rest as much as possible  Try to keep all activities short   You may be able to do some exercise soon after you have your baby  Talk with your healthcare provider before you start exercising  If you work outside the home, ask when you can return to your job  Kegel exercises:  Kegel exercises may help your vaginal and rectal muscles heal faster  You can do Kegel exercises by tightening and relaxing the muscles around your vagina  Kegel exercises help make the muscles stronger  Breast care:  When your milk comes in, your breasts may feel full and hard  Ask how to care for your breasts, even if you are not breastfeeding  Constipation:  You may have constipation for a period of time after you have your baby  Do not try to push the bowel movement out if it is too hard  High-fiber foods and extra liquids can help you prevent constipation  Examples of high-fiber foods are fruit and bran  Prune juice and water are good liquids to drink  You may also be told to take over-the-counter fiber and stool softener medicines  Take these items as directed  Ask how to prevent or treat hemorrhoids  Perineum care: Your perineum is the area between your vagina and anus  Keep the area clean and dry  This will help it heal and prevent infection  Wash the area gently with soap and water when you bathe or shower  Rinse your perineum with warm water after you urinate or have a bowel movement  Your healthcare provider may suggest you use a warm sitz bath to help decrease pain  To take a sitz bath, fill a bathtub with 4 to 6 inches of warm water  You may also use a sitz bath pan that fits inside the toilet  Sit in the sitz bath for 20 minutes  Do this 2 to 3 times a day, or as directed  The warm water can help decrease pain and swelling  Vaginal discharge: You will have vaginal discharge, called lochia, after your delivery  The lochia is red or dark brown with clots for 1 to 3 days after the birth  The amount will decrease and turn pale pink or brown for 3 to 10 days   It will turn white or yellow on the 10th or 14th day  Lochia is usually gone within 3 weeks  Use a sanitary pad rather than a tampon to prevent a vaginal infection  You will have lochia for up to 3 weeks after your baby is born  Monthly periods: Your period may start again within 7 to 9 weeks after your baby is born  If you are breastfeeding, it may take longer for your period to start again  You can still get pregnant again even though you do not have your monthly period  Talk with your healthcare provider about a birth control method if you do not want to get pregnant  Mood changes: Many new mothers have some kind of mood changes after delivery  Some of these changes occur because of lack of sleep, hormone changes, and caring for a new baby  Some mood changes can be more serious, such as postpartum depression  Talk with your healthcare provider if you feel unable to care for yourself or your baby  Sexual activity:  Do not have sex until your healthcare provider says it is okay  You may notice you have a decreased desire for sex, or sex may be painful  You may need to use a vaginal lubricant (gel) to help make sex more comfortable  © 2017 [facility] Information is for End User's use only and may not be sold, redistributed or otherwise used for commercial purposes  All illustrations and images included in CareNotes® are the copyrighted property of A D A M , Inc  or Nathan Blum  The above information is an  only  It is not intended as medical advice for individual conditions or treatments  Talk to your doctor, nurse or pharmacist before following any medical regimen to see if it is safe and effective for you  Postpartum Bleeding   WHAT YOU NEED TO KNOW:   Postpartum bleeding is vaginal bleeding after childbirth  This bleeding is normal, whether your baby was born vaginally or by   It contains blood and the tissue that lined the inside of your uterus when you were pregnant     DISCHARGE INSTRUCTIONS:   What to expect with postpartum bleeding:  Postpartum bleeding usually lasts at least 10 days, and may last longer than 6 weeks  Your bleeding may range from light (barely staining a pad) to heavy (soaking a pad in 1 hour)  Usually, you have heavier bleeding right after childbirth, which slows over the next few weeks until it stops  The bleeding is red or dark brown with clots for the first 1 to 3 days  It then turns pink for several days, and then becomes a white or yellow discharge until it ends  Follow up with your obstetrician as directed:  Do not have sex until your obstetrician says it is okay  Write down your questions so you remember to ask them during your visits  Contact your healthcare provider or obstetrician if:   · Your bleeding increases, or you have heavy bleeding that soaks a pad in 1 hour for 2 hours in a row  · You pass large blood clots  · You are breathing faster than normal, or your heart is beating faster than normal     · You are urinating less than usual, or not at all  · You feel dizzy  · You have questions or concerns about your condition or care  Seek immediate care or call 911 if:   · You are suddenly short of breath and feel lightheaded  · You have sudden chest pain  © 2017 [address]  Information is for End User's use only and may not be sold, redistributed or otherwise used for commercial purposes  All illustrations and images included in CareNotes® are the copyrighted property of [employer] Inc  or Nathan Blum  The above information is an  only  It is not intended as medical advice for individual conditions or treatments  Talk to your doctor, nurse or pharmacist before following any medical regimen to see if it is safe and effective for you  Breast Care for the Breast Feeding Mother   WHAT YOU SHOULD KNOW:   Your breasts will go through normal changes while you are breastfeeding   Sometimes breast and nipple problems can develop while you are breastfeeding  Learn about changes that are normal and those that may be a problem  Breast care can help you prevent and manage problems so you and your baby can enjoy the benefits of breastfeeding  AFTER YOU LEAVE:   Breast changes while you are breastfeeding:   · For the first few days after your baby is born, your body makes a small amount of breast milk (colostrum)  Within about 2 to 5 days, your body will begin making mature milk  It may take up to 10 days or longer for mature milk to come in  When your mature milk comes in, your breasts will become full and firm  They may feel tender  · Breastfeeding your baby will decrease the full feeling in your breasts  You may feel a tingly sensation during feedings as milk is released from your breasts  This is called the milk let-down reflex  After 7 or more days, the fullness may feel like it has decreased  Your nipples should look the same as they did before you started breastfeeding  Breasts that feel full before and empty after breastfeeding are signs that breastfeeding is going well  Breast problems that can occur while you are breastfeeding:   · Nipple soreness  may occur when you begin to breastfeed your baby  You may also have nipple soreness if your baby is not latched on to your breast correctly  Correct positioning and latch-on may decrease or stop the pain in your nipples  Work with your caregivers to help your baby latch on correctly  It may also be helpful to place warm, wet compresses on your nipples to help decrease pain  · Plugged milk ducts  may cause painful breast lumps  Plugged ducts may be caused by not emptying your breasts completely during feedings  When your baby pauses during breastfeeding, massage and gently squeeze your breast  Gentle massage may unplug a blocked milk duct  Pump out any milk left in your breasts after your baby is done breastfeeding   Avoid wearing tight tops, tight bras, or under-wire bras, because they may put pressure on your breasts  · Engorgement  may occur as your milk comes in soon after you begin breastfeeding  Engorgement may cause your breasts to become swollen and painful  Your breasts may also become engorged if you miss a feeding or you do not breastfeed on demand  The best way to decrease engorgement symptoms is to empty your breasts by feeding your baby often  Engorgement can make it hard for your baby to latch on to your breast  If this happens, express a small amount of milk and then have your baby latch on  Cold compresses, gel packs, or ice packs on your breasts can help decrease pain and swelling  Ask your caregiver how often and how long you should use cold, or ice packs  · A breast infection called mastitis  can develop if you have plugged milk ducts or engorgement  Mastitis causes your breasts to become red, swollen, and painful  You may also have flu-like symptoms, such as chills and a fever  Place heat on your breasts to help decrease the pain  You may want to place a moist, warm cloth on the painful breast or both of your breasts  Ask how often to do this  Your primary healthcare provider [facility]) may suggest that you take an NSAID, such as ibuprofen, to decrease pain and swelling  He may also order antibiotics to treat mastitis  Ask about feeding your baby when you have a breast infection  How to help prevent or manage breast problems while you are breastfeeding:   · Learn how to position your baby and latch him on correctly  To latch your baby correctly to your breast, make sure that his mouth covers most of your areola (dark area around your nipple)  He should not be attached only to the nipple  Your baby is latched on well if you feel comfortable and do not feel pain  A correct latch helps him get enough milk and can help to prevent sore nipples and other breast problems  There are several breastfeeding positions that you can try   Find the position that works best for you and your baby  Ask your caregiver for more information about how to hold and breastfeed your baby  · Prevent biting  Your baby may get teeth at about 1to 3months of age  To help prevent biting, break his suction once he is finished or if he has fallen asleep  To break his suction, slip a finger into the side of his mouth  If your baby bites you, respond with surprise or unhappiness  Offer praise when he does not bite you  · Breastfeed your baby regularly  Feed your baby 8 to 12 times a day  You may need to wake up your baby at night to feed him  It is okay to feed from 1 or both breasts at each feeding  Your baby should breastfeed from both breasts equally over the course of a day  If your baby only feeds from 1 side during a feeding, offer your other breast to him first for the next feeding  · Schedule and keep follow-up visits  Talk to your baby's pediatrician or your PHP during follow-up visits if you have breast problems  Caregivers may suggest that you, or you and your partner, attend classes on breastfeeding  You also may want to join a breastfeeding support group  Caregivers may suggest that you see a lactation consultant  This is a caregiver who can help you with breastfeeding  Contact your PHP if:   · You have a fever and chills  · You have body aches and you feel like you do not have any energy  · One or both of your breasts is red, swollen or hard, painful, and feels warm or hot  · You have breast engorgement that does not get better within 24 hours  · You see or feel a lump in your breast that hurts when you touch it  · You have nipple pain during breastfeeding or between feedings  · Your nipples are red, dry, cracked, or bleeding, or they have scabs on them  · You have questions or concerns about your condition or care    © 2014 [address] is for End User's use only and may not be sold, redistributed or otherwise used for commercial purposes  All illustrations and images included in CareNotes® are the copyrighted property of [employer]  or [employer]   The above information is an  only  It is not intended as medical advice for individual conditions or treatments  Talk to your doctor, nurse or pharmacist before following any medical regimen to see if it is safe and effective for you  Effects of Smoking, Alcohol, and Medicines on Breastfeeding   WHAT YOU NEED TO KNOW:   Smoking, alcohol, and medicines can all affect breastfeeding  Harmful substances can pass to your baby through your breast milk  Learn about the ways that these affect breastfeeding so that you can safely breastfeed your baby  DISCHARGE INSTRUCTIONS:   Seek care immediately if:   · Your baby has breathing problems, seems more sleepy than usual, or is not breastfeeding well  · Your baby feels cold, shivers, or his skin looks blue or pale  · Your baby shows signs of dehydration, such as sunken eyes, dry skin, fast breathing, or few or no wet diapers  Contact your healthcare provider if:   · Your baby is 3or more days old and has fewer than 6 wet diapers each day  · Your baby is 3or more days old and has fewer than 3 bowel movements each day  · Your baby is breastfeeding fewer than 8 times each day  · Your baby is not gaining weight or looks like he is losing weight  · Your baby is crying more than usual     · Your baby is vomiting, or develops a skin rash  · You have been using illegal drugs and breastfeeding  · You have been breastfeeding and drinking more alcohol than your healthcare provider says is safe  · You have been taking medicines that you have been told not to take while breastfeeding  · You have questions or concerns about breastfeeding  What you need to know about smoking and breastfeeding:  Breastfeeding mothers should not smoke   Nicotine goes into your breast milk  Your baby is exposed to these chemicals through breastfeeding and inhaling cigarette smoke  Smoking can also decrease the amount of breast milk you make  Do not use e-cigarettes or smokeless tobacco in place of cigarettes or to help you quit  They still contain nicotine  Ask your healthcare provider for information if you currently smoke and need help quitting  How to decrease the harmful effects of smoking while breastfeeding: If you choose to smoke, it is still best to breastfeed your baby  Breastfeeding can help protect your baby from breathing problems and SIDS  You can decrease the effects of smoking by doing the following:  · Do not smoke in your home  Go outside to smoke  Do not allow others to smoke in your home  · Wait to smoke until after a breastfeeding session  The harmful chemicals in your breast milk decrease about 1 hour after you finish smoking  What you need to know about alcohol and breastfeeding:  Alcohol goes from your bloodstream to your breast milk  The amount of alcohol in breast milk is highest 60 to 90 minutes after you drink alcohol with a meal  Alcohol affects the taste of your breast milk and may cause your baby to drink less than normal  Drinking alcohol regularly or in large amounts can also decrease your milk supply  Alcohol may also affect your child's sleep  How to decrease the harmful effects of alcohol while breastfeeding: If you plan to drink alcohol, breastfeed your baby first  Wait at least 2 hours after you drink before you breastfeed again  This will allow your body to get rid of the alcohol so the amount in your breast milk will decrease  If you abuse alcohol, you may not be able to breastfeed  Alcohol abuse is when you drink too much alcohol or drink it too often  Talk to your healthcare provider about drinking alcohol while breastfeeding    What you need to know about medicines and breastfeeding:   · Some medicines may go into your breast milk and affect your baby  Certain medicines can decrease your milk supply, make your baby very sleepy, or affect your baby in other ways  Tell your healthcare provider about all of the medicines you use, including over-the-counter medicines  Tell him how often and how much medicine you use  Your healthcare provider can usually recommend other safe medicines, if needed  · Medicine used during labor and delivery may affect breastfeeding  Your baby may have trouble latching on to your breast for the first 24 hours if you had anesthesia or medicine to decrease pain  To help your baby breastfeed, hold him close to you, with his skin touching yours  You also may need to express milk to feed your baby  Ask your healthcare provider for more information about expressing milk  · You may need to stop breastfeeding while you are taking certain medicines  You may need to express your milk and dump it out while you use certain medicines  You may need to do this if you take medicines for a short time that may be harmful to your baby  This helps your breasts to keep making milk until you can breastfeed again  If possible, feed your baby stored breast milk until your healthcare provider tells you that it is safe to breastfeed again  Rarely, you may need to stop breastfeeding completely if you need to take certain medicines for a long period of time  You can feed your baby stored breast milk, breast milk from a donor milk bank, or infant formula from a bottle  What you need to know about illegal drugs and breastfeeding:  Do not  breastfeed if you use illegal drugs  Illegal drugs pass from your bloodstream into your breast milk and are harmful to your baby's health  Some examples of illegal drugs are cocaine, heroin, LSD, marijuana, methamphetamine (meth), and phencyclidine (PCP)  Follow up with your healthcare provider as directed:  Write down your questions so you remember to ask them during your visits     For support and more information:   · [employer] [address] [facility] , [place] [address]  Phone: 1- 162 - 823-6226  Web Address: http://www [employer]/  · [facility]  [address]   [facility] Cristine Echeverria  Phone: 0- 512 - 078-0116  Phone: 7- 080 - 824-7052  Web Address: http://www [facility] [place]/  org  © 2017 [address] [facility] Information is for End User's use only and may not be sold, redistributed or otherwise used for commercial purposes  All illustrations and images included in CareNotes® are the copyrighted property of [employer]  or Nathan Blum  The above information is an  only  It is not intended as medical advice for individual conditions or treatments  Talk to your doctor, nurse or pharmacist before following any medical regimen to see if it is safe and effective for you

## 2018-01-12 NOTE — PROGRESS NOTES
POSTPARTUM NOTE  Syed Hazel 29 y o  female MRN: [id number]  Unit/Bed#: -01 Encounter: [id number]    Date: 01/12/18  Procedure: Spontaneous Vaginal Delivery  PostpartumDay #: 1    SUBJECTIVE:  Pain: no  Tolerating Oral Intake: yes   Voiding: yes  Flatus: yes  Bowel Movement: yes  Ambulating: yes  Breastfeeding: yes  Chest Pain: no  Shortness of Breath: no  Leg Pain/Discomfort: no  Lochia: moderate      OBJECTIVE:   Vitals: Temp:  [97 9 °F (36 6 °C)-98 4 °F (36 9 °C)] 98 4 °F (36 9 °C)  HR:  [] 83  Resp:  [18-20] 18  BP: ()/(51-75) 109/66  General: No Acute Distress   Cardiovascular: Regular, Rate and Rhythm, no murmur, normal S1/S2  Lungs: Clear to Auscultation Bilaterally, no wheezing, non-labored breathing  Abdomen: Soft, non-distended, non-tender, no rebound, guarding or CVA tenderness  Fundus: Firm & Non-Tender, Fundal Location: at the umbilicus  Lower Extremities: Non-tender, Edema Minimal      LABS / TESTS / MEDICATION:    Recent Results (from the past 72 hour(s))   Type and screen    Collection Time: 01/10/18 11:58 AM   Result Value Ref Range    ABO Grouping A     Rh Factor Positive     Antibody Screen Negative     Specimen Expiration Date [id number]    CBC and differential    Collection Time: 01/10/18 11:58 AM   Result Value Ref Range    WBC 9 10 4 31 - 10 16 Thousand/uL    RBC 4 20 3 81 - 5 12 Million/uL    Hemoglobin 12 5 11 5 - 15 4 g/dL    Hematocrit 36 3 34 8 - 46 1 %    MCV 86 82 - 98 fL    MCH 29 8 26 8 - 34 3 pg    MCHC 34 4 31 4 - 37 4 g/dL    RDW 13 8 11 6 - 15 1 %    MPV 11 3 8 9 - 12 7 fL    Platelets 511 968 - 866 Thousands/uL    nRBC 0 /100 WBCs    Neutrophils Relative 78 (H) 43 - 75 %    Lymphocytes Relative 14 14 - 44 %    Monocytes Relative 8 4 - 12 %    Eosinophils Relative 0 0 - 6 %    Basophils Relative 0 0 - 1 %    Neutrophils Absolute 7 03 1 85 - 7 62 Thousands/µL    Lymphocytes Absolute 1 28 0 60 - 4 47 Thousands/µL    Monocytes Absolute 0 73 0 17 - 1 22 Thousand/µL Eosinophils Absolute 0 04 0 00 - 0 61 Thousand/µL    Basophils Absolute 0 01 0 00 - 0 10 Thousands/µL   RPR    Collection Time: 01/10/18 11:58 AM   Result Value Ref Range    RPR Non-Reactive Non-Reactive   Blood gas, arterial, cord    Collection Time: 01/11/18  1:15 AM   Result Value Ref Range    pH, Cord Art 7 282 7 230 - 7 430    pCO2, Cord Art 45 3 30 0 - 60 0    pO2, Cord Art 34 4 (H) 5 0 - 25 0 mm HG    HCO3, Cord Art 20 9 17 3 - 27 3 mmol/L    Base Exc, Cord Art -5 8 (L) 3 0 - 11 0 mmol/L    O2 Content, Cord Art 15 6 ml/dl    O2 Hgb, Arterial Cord 69 6 %   Blood gas, venous, cord    Collection Time: 01/11/18  1:15 AM   Result Value Ref Range    pH, Cord Cole 7 390 7 190 - 7 490    pCO2, Cord Cole 30 5 27 0 - 43 0 mm HG    pO2, Cord Cole 36 5 15 0 - 45 0 mm HG    HCO3, Cord Cole 18 0 12 2 - 28 6 mmol/L    Base Exc, Cord Cole -5 5 (L) 1 0 - 9 0 mmol/L    O2 Cont, Cord Cole 17 7 mL/dL    O2 HGB,VENOUS CORD 79 7 %         docusate sodium 100 mg Oral BID       acetaminophen 650 mg Q6H PRN   benzocaine-menthol-lanolin-aloe  4x Daily PRN   calcium carbonate 1,000 mg Daily PRN   diphenhydrAMINE 25 mg Q6H PRN   hydrocortisone 1 application PRN   ibuprofen 600 mg Q6H PRN   ondansetron 4 mg Q8H PRN   oxyCODONE-acetaminophen 1 tablet Q4H PRN   witch hazel-glycerin 1 pad PRN       ASSESSMENT:   29 y o  [place] Henrique s/p Spontaneous Vaginal Delivery Postpartum day  1  PLAN:  - Continue routine postpartum care  - Encourage ambulation  - Continue current diet  - Pain medication as needed    Signature / Title:  Caitlyn Goldberg MD, Family Medicine  Date: 1/12/2018  Time: 5:24 AM

## 2018-01-12 NOTE — SOCIAL WORK
Breast pump consult  Per pt request, Ameda pump with $50 upgrade kit ordered from [facility] via [place] for d/c tomorrow  No other CM needs noted

## 2018-01-12 NOTE — MISCELLANEOUS
Message   Recorded as Task   Date: 11/13/2017 09:08 AM, Created By: Stephen Faria   Task Name: Follow Up   Assigned To: Berhane Caba   Regarding Patient: Vanessa Khan, Status: In Progress   Comment:    Regla Ramirez - 13 Nov 2017 9:08 AM     TASK CREATED  Can you please let Harshal Penn know she passed her 3 hour glucola! Seema Stark - 13 Nov 2017 9:37 AM     TASK EDITED   Seema Stark - 13 Nov 2017 9:38 AM     TASK EDITED  I spoke with patient she is aware  Active Problems    1  28 weeks gestation of pregnancy (V22 2) (Z3A 28)   2  Abnormal glucose tolerance test (790 22) (R73 02)   3  Encounter for supervision of other normal pregnancy in second trimester (V22 1)   (Z34 82)   4  Needs flu shot (V04 81) (Z23)   5  Spotting in pregnancy (649 50) (O26 859)    Current Meds   1  Daily Value Multivitamin TABS; Therapy: (Zoya Ames) to Recorded   2  Folic Acid TABS; Therapy: (Recorded:38Xdk0290) to Recorded    Allergies    1  No Known Drug Allergies    2  No Known Environmental Allergies   3   No Known Food Allergies    Signatures   Electronically signed by : Annmarie Villa, ; Nov 13 2017  9:38AM EST                       (Author)

## 2018-01-13 VITALS
HEART RATE: 76 BPM | RESPIRATION RATE: 18 BRPM | WEIGHT: 190 LBS | HEIGHT: 61 IN | SYSTOLIC BLOOD PRESSURE: 121 MMHG | OXYGEN SATURATION: 96 % | DIASTOLIC BLOOD PRESSURE: 65 MMHG | TEMPERATURE: 98.2 F | BODY MASS INDEX: 35.87 KG/M2

## 2018-01-13 VITALS
WEIGHT: 155 LBS | BODY MASS INDEX: 29.27 KG/M2 | DIASTOLIC BLOOD PRESSURE: 72 MMHG | HEIGHT: 61 IN | SYSTOLIC BLOOD PRESSURE: 104 MMHG

## 2018-01-13 RX ORDER — IBUPROFEN 600 MG/1
600 TABLET ORAL EVERY 6 HOURS PRN
Qty: 30 TABLET | Refills: 0 | Status: SHIPPED | OUTPATIENT
Start: 2018-01-13 | End: 2018-05-01 | Stop reason: ALTCHOICE

## 2018-01-13 RX ORDER — DIAPER,BRIEF,INFANT-TODD,DISP
1 EACH MISCELLANEOUS AS NEEDED
Qty: 30 G | Refills: 0 | Status: SHIPPED | OUTPATIENT
Start: 2018-01-13 | End: 2018-05-01 | Stop reason: ALTCHOICE

## 2018-01-13 RX ORDER — ACETAMINOPHEN 325 MG/1
TABLET ORAL
Qty: 30 TABLET | Refills: 0 | Status: SHIPPED | OUTPATIENT
Start: 2018-01-13 | End: 2018-05-01 | Stop reason: ALTCHOICE

## 2018-01-13 RX ADMIN — DOCUSATE SODIUM 100 MG: 100 CAPSULE, LIQUID FILLED ORAL at 10:13

## 2018-01-13 NOTE — CASE MANAGEMENT
Notification of Birth and  Information  This is a Notification of birth and  information to our facility [facility]  Please be advised that this patient is currently in our facility under Inpatient Status  Below you will find the Birth/ Summary, Attending Physician and Facilitys information including NPI# and contact for the Utilization  assigned to the [facility] where the patient is receiving services  Please feel free to contact the Utilization Review Department with any questions  Mothers Information:  Mauro Albert  MRN: [id number]  YOB: 1985  Admission Date: 1/10/2018  9:17 AM  Discharge Date: 2018  1:20 PM  Disposition: Home/Self Care  Admitting Diagnosis: Encounter for full-term uncomplicated delivery [L10]   Information:  Estimated Date of Delivery: 18  Information for the patient's :  Aramis Lena Girl Donny Dennis [[id number]]      Delivery Information:  Sex: female  Delivered 2018 1:08 AM by Vaginal, Spontaneous Delivery; Gestational Age: 42w0d     Measurements:  Weight: 6 lb 12 3 oz (3070 g); Height: 19"    APGAR 1 minute 5 minutes 10 minutes   Totals: 9 9      OB History      Para Term  AB Living    1 1 1 0 0 1    SAB TAB Ectopic Multiple Live Births    0 0 0 0 1        Attending Physician:  ANA Mcguire  Specialty- Obstetrics and Gynecology  [address] [id number]  [address], [address] [facility]  Phone 1: [phone]  Fax: [phone]    [facility]  300 [facility], [address] [facility]  [phone]  Tax ID: [id number]  NPI: [id number]    [address] [place] [address] in the [facility] by Nathan Blum for 2017  Network Utilization Review Department  Phone: [phone];  Fax [phone]  ATTENTION: The Network Utilization Review Department is now centralized for our 7 Facilities  Make a note that we have a new phone and fax numbers for our Department  Please call with any questions or concerns to [phone] and carefully follow the prompts so that you are directed to the right person  All voicemails are confidential  Fax any determinations, approvals, denials, and requests for initial or continue stay review clinical to [phone]  Due to HIGH CALL volume, it would be easier if you could please send faxed requests to expedite your requests and in part, help us provide discharge notifications faster

## 2018-01-13 NOTE — MISCELLANEOUS
Message   Recorded as Task   Date: 11/06/2017 08:49 AM, Created By: [address]   Task Name: Care Coordination   Assigned To: [facility],Team   Regarding Patient: Denisse Pa, Status: In Progress   Comment:    Regla Ramirez - 06 Nov 2017 8:49 AM     TASK CREATED  Can you let Charissa Yoder know that she failed her 1 hour glucola, and will need a 3 hour GTT   Mariluz Penny - 06 Nov 2017 9:11 AM     TASK IN PROGRESS   Mariluz Penny - 06 Nov 2017 9:17 AM     TASK EDITED  Spoke with pt  She is aware  Instructions for test reviewed, order placed  Active Problems    1  Abnormal glucose tolerance test (790 22) (R73 02)   2  Encounter for supervision of other normal pregnancy in second trimester (V22 1)   (Z34 82)   3  Needs flu shot (V04 81) (Z23)   4  Spotting in pregnancy (649 50) (O26 859)    Current Meds   1  Daily Value Multivitamin TABS; Therapy: (Harris Hernandez) to Recorded   2  Folic Acid TABS; Therapy: (Recorded:78Dqf2274) to Recorded    Allergies    1  No Known Drug Allergies    2  No Known Environmental Allergies   3  No Known Food Allergies    Plan  Abnormal glucose tolerance test    · (1) GLUCOSE TOLERANCE TEST, 3HR; Status:Active - Retrospective Authorization;   Requested RRL:[id number];     Signatures   Electronically signed by : Candance Kluver, ; Nov 6 2017  9:17AM EST                       (Author)

## 2018-01-14 VITALS
HEIGHT: 61 IN | SYSTOLIC BLOOD PRESSURE: 108 MMHG | WEIGHT: 168 LBS | BODY MASS INDEX: 31.72 KG/M2 | DIASTOLIC BLOOD PRESSURE: 61 MMHG

## 2018-01-14 VITALS
DIASTOLIC BLOOD PRESSURE: 80 MMHG | BODY MASS INDEX: 30.21 KG/M2 | WEIGHT: 160 LBS | HEIGHT: 61 IN | SYSTOLIC BLOOD PRESSURE: 120 MMHG

## 2018-01-17 NOTE — PROGRESS NOTES
Chief Complaint  PT had flu shot today given on left arm   lot# [id number]  OQJ#[id number]  EXP 6/30/18      Active Problems    1  Encounter for supervision of other normal pregnancy in second trimester (V22 1)   (Z34 82)   2  Needs flu shot (V04 81) (Z23)   3  Spotting in pregnancy (649 50) (O26 859)    Current Meds   1  Daily Value Multivitamin TABS; Therapy: (Doug Floyd) to Recorded   2  Folic Acid TABS; Therapy: (Recorded:61Jdz6695) to Recorded    Allergies    1  No Known Drug Allergies    2  No Known Environmental Allergies   3  No Known Food Allergies    Vitals  Signs    Systolic: 074, LUE, Sitting  Diastolic: 70, LUE, Sitting  Height: 5 ft 1 in  Weight: 176 lb   BMI Calculated: 33 26  BSA Calculated: 1 79    Plan  Encounter for supervision of other normal pregnancy in second trimester    · (1) CBC/ PLT (NO DIFF); Status:Active - Retrospective By Protocol Authorization; Requested for:24Flv6924;    · (1) GLUCOSE, 1HR PG (50gm Glu Challenge Preg-Pts); Status:Active - Retrospective By  Protocol Authorization; Requested for:48Vqb3366;    · (1) RPR; Status:Active - Retrospective By Protocol Authorization; Requested  for:28Ypl4509;   Needs flu shot    · Fluzone Quadrivalent 0 5 ML Intramuscular Suspension Prefilled Syringe    Future Appointments    Date/Time Provider Specialty Site   04/30/2018 08:40 AM ANA Arias  Obstetrics/Gynecology [facility]   11/17/2017 01:00 PM ANA Sánchez  Obstetrics/Gynecology [facility]   01/05/2018 09:20 AM KIERSTEN Sigala Obstetrics/Gynecology [facility]   11/07/2017 10:00 AM Kirk Michel DO Obstetrics/Gynecology [facility]   01/10/2018 11:40 AM Kirk Michel DO Obstetric[facility]Gynecology [facility]   12/13/2017 09:40 AM Duncan Chen [facility]ne[facility]   12/27/2017 10:00 AM Duncan Chen [facility] Ob[facility]etrics/Gynecology [facility]   11/28/2017 11:20 ANA Mcallister   Obstetrics/Gynecology [facility] 01/16/2018 09:00 AM ANA Wyman  Obstetrics/Gynecology [facility]   01/23/2018 11:20 ANA Herrmann   Obstetrics/Gynecology [facility]     Signatures   Electronically signed by : Zackery Dao MA; Oct 10 2017 10:48AM EST                       (Author)    Electronically signed by : ANA Duncan ; Oct 10 2017 10:59AM EST                       (Author)

## 2018-01-22 VITALS
SYSTOLIC BLOOD PRESSURE: 118 MMHG | HEIGHT: 61 IN | WEIGHT: 158 LBS | DIASTOLIC BLOOD PRESSURE: 64 MMHG | BODY MASS INDEX: 29.83 KG/M2

## 2018-01-22 VITALS
WEIGHT: 180 LBS | DIASTOLIC BLOOD PRESSURE: 74 MMHG | BODY MASS INDEX: 33.99 KG/M2 | SYSTOLIC BLOOD PRESSURE: 124 MMHG | HEIGHT: 61 IN

## 2018-01-22 VITALS
SYSTOLIC BLOOD PRESSURE: 110 MMHG | HEIGHT: 61 IN | WEIGHT: 176 LBS | BODY MASS INDEX: 33.23 KG/M2 | DIASTOLIC BLOOD PRESSURE: 70 MMHG

## 2018-01-22 VITALS
WEIGHT: 163 LBS | SYSTOLIC BLOOD PRESSURE: 100 MMHG | BODY MASS INDEX: 30.78 KG/M2 | DIASTOLIC BLOOD PRESSURE: 62 MMHG | HEIGHT: 61 IN

## 2018-01-22 VITALS
RESPIRATION RATE: 16 BRPM | HEIGHT: 61 IN | BODY MASS INDEX: 34.36 KG/M2 | DIASTOLIC BLOOD PRESSURE: 60 MMHG | SYSTOLIC BLOOD PRESSURE: 110 MMHG | WEIGHT: 182 LBS

## 2018-01-22 VITALS
SYSTOLIC BLOOD PRESSURE: 116 MMHG | WEIGHT: 167 LBS | DIASTOLIC BLOOD PRESSURE: 64 MMHG | BODY MASS INDEX: 31.53 KG/M2 | HEIGHT: 61 IN

## 2018-01-22 LAB — PLACENTA IN STORAGE: NORMAL

## 2018-01-23 NOTE — PROGRESS NOTES
2018         RE: Aliyah Yoon                                 To: [facility]  MR#: [id number]                                   [address]  :  [address]: [id number]:Lani Montalvo U  6    (Exam #: [id number])                           Fax: [phone]      The LMP of this 29year old,  G1, P0-0-0-0 patient was 2017, her   working CHON is 2018 and the current gestational age is 42 weeks 5   days by [address] [place] [address]  A sonographic examination was performed on 2018 using real time equipment  The ultrasound examination was performed   using abdominal technique  The patient has a BMI of 36 1  Her blood   pressure today was 115/55  Cardiac motion was observed at 136 bpm       INDICATIONS      fetal growth   obesity   elevated  1 hr  GTT      Exam Types      Level I      RESULTS      Fetus # 1 of 1   Vertex presentation   Fetal growth appeared normal   Placenta Location = Anterior   Placenta Grade = II      MEASUREMENTS (* Included In Average GA)      AC              34 7 cm        38 weeks 6 days* (79%)   BPD              9 0 cm        36 weeks 4 days* (36%)   HC              32 5 cm        36 weeks 2 days* (17%)   Femur            7 2 cm        36 weeks 3 days* (36%)      Cerebellum       5 5 cm        36 weeks 4 days      HC/AC           0 94   FL/AC           0 21   FL/BPD          0 80   EFW (Ac/Fl/Hc)  3283 grams - 7 lbs 4 oz                 (59%)      THE AVERAGE GESTATIONAL AGE is 37 weeks 0 days +/- 21 days  AMNIOTIC FLUID      Q1: 4 0      Q2: 3 2      Q3: 1 9      Q4: 6 4   ELE Total = 15 5 cm   Amniotic Fluid: Normal      ANATOMY COMMENTS      Fetal anatomy has been previously assessed and documented in our Center   and no anomalies were identified  No fetal structural abnormality is   identified on the Level I survey today   Follow up intracranial anatomy   (calvarium, cavum, lateral ventricles, and cerebellum), cardiac anatomy   (4chamber, RVOT, and 3VV), diaphragm, stomach, kidneys, and bladder appear   normal  The LVOT was not visualized on today's exam due to fetal position,   late gestational age and maternal body habitus  BIOPHYSICAL PROFILE      The Biophysical Profile score was 8/8  Breathin  Movement: 2  Tone: 2  AFV: 2      IMPRESSION      Ybarra IUP   37 weeks and 0 days by this ultrasound  (CHON=2018)   37 weeks and 5 days by 1st Tri Sono  (CHON=2018)   Vertex presentation   Fetal growth appeared normal   Regular fetal heart rate of 136 bpm   Anterior placenta      GENERAL COMMENT      On exam today, Kimberlyn appears well, in no acute distress, and denies any   complaints  Her abdomen is non-tender  There has been appropriate interval fetal growth  Good fetal movement and   tone are seen  The amniotic fluid volume appears normal   The patient was   informed of today's findings and all of her questions were answered  The   limitations of ultrasound were reviewed   labor precautions and   fetal kick counts were reviewed  We discussed follow-up in detail and I recommend the patient return as   clinically indicated  Thank you very much for allowing us to participate in the care of this   very nice patient  Should you have any questions, please do not hesitate   to contact our office  Portions of the record may have been created with voice recognition   software  Occasional wrong word or "sound a like" substitutions may have   occurred due to the inherent limitations of voice recognition software  Read the chart carefully and recognize, using context, where substitutions   have occurred  RAJI Clayton M D     Electronically signed 18 11:28

## 2018-01-24 VITALS
SYSTOLIC BLOOD PRESSURE: 115 MMHG | HEIGHT: 61 IN | DIASTOLIC BLOOD PRESSURE: 55 MMHG | WEIGHT: 190.05 LBS | BODY MASS INDEX: 35.88 KG/M2 | HEART RATE: 87 BPM

## 2018-01-24 VITALS
SYSTOLIC BLOOD PRESSURE: 120 MMHG | WEIGHT: 190.38 LBS | HEIGHT: 61 IN | BODY MASS INDEX: 35.94 KG/M2 | DIASTOLIC BLOOD PRESSURE: 74 MMHG

## 2018-01-24 VITALS
BODY MASS INDEX: 35.78 KG/M2 | DIASTOLIC BLOOD PRESSURE: 62 MMHG | WEIGHT: 189.38 LBS | SYSTOLIC BLOOD PRESSURE: 104 MMHG

## 2018-01-24 VITALS
DIASTOLIC BLOOD PRESSURE: 68 MMHG | SYSTOLIC BLOOD PRESSURE: 112 MMHG | BODY MASS INDEX: 34.62 KG/M2 | WEIGHT: 183.25 LBS

## 2018-02-21 ENCOUNTER — POSTPARTUM VISIT (OUTPATIENT)
Dept: OBGYN CLINIC | Facility: CLINIC | Age: 33
End: 2018-02-21

## 2018-02-21 VITALS — DIASTOLIC BLOOD PRESSURE: 70 MMHG | SYSTOLIC BLOOD PRESSURE: 100 MMHG | WEIGHT: 164 LBS | BODY MASS INDEX: 30.99 KG/M2

## 2018-02-21 PROBLEM — O42.90 PREMATURE RUPTURE OF MEMBRANES: Status: RESOLVED | Noted: 2018-01-10 | Resolved: 2018-02-21

## 2018-02-21 PROBLEM — Z3A.37 37 WEEKS GESTATION OF PREGNANCY: Status: RESOLVED | Noted: 2018-01-10 | Resolved: 2018-02-21

## 2018-02-21 PROCEDURE — 99024 POSTOP FOLLOW-UP VISIT: CPT | Performed by: OBSTETRICS & GYNECOLOGY

## 2018-02-21 NOTE — PROGRESS NOTES
Kimberlyn Wasem  1985    S:  29 y o  female here for postpartum visit  She is s/p Uncomplicated vaginal delivery on 1/10/2018  Gender: female   Apgars: 9/9  Weight: 6 lbs 12 oz  Her lochia has resolved  She is Using breast pump without problems  Her pregnancy was uncomplicated  She denies postpartum blues/depression  [place] score was 4  We discussed contraceptive options in detail including birth control pills, patches, NuvaRing, DepoProvera, Mirena/Kyleena IUD, Nexplanon in detail  At this point she would like condoms and/or natural family planning  O:   She appears well and in no distress  Abdomen is soft and nontender  External genitals are normal  Vagina is normal  Cervix, uterus and adnexa are nontender, no masses palpable  A/P:  Postpartum visit  She will return in 2-3 months for her yearly exam, sooner PRN

## 2018-03-07 NOTE — PROGRESS NOTES
Education  Baby & Me Education 1st Trimester:   First Trimester Education provided:   benefits of breastfeeding, importance of exclusive breastfeeding, early initiation of breastfeeding and exclusive breastfeeding for the first 6 months   The patient is planning on breastfeeding     Prenatal education provided by: Bree Jordan   Electronically signed by : Rubén Castro, ; Jul 20 2017 10:54AM EST                       (Author)

## 2018-04-05 ENCOUNTER — DOCUMENTATION (OUTPATIENT)
Dept: OBGYN CLINIC | Facility: CLINIC | Age: 33
End: 2018-04-05

## 2018-04-30 NOTE — PROGRESS NOTES
Assessment/Plan:    Reviwed ASCCP guidelines - aware pap and HPV today, and if normal won't repeat until   Reviewed pregnancy precautions - aware she will ovulate prior to first menses - and there is no way to predict this  She reports that are not frequently sexually active ,and are "safe"  Encounter for annual routine gynecological examination  -     Liquid-based pap, screening        Subjective:      Patient ID: Tracy Burnette is a 29 y o  female  Yearly exam    Menarche:14-  LMP:Breastfeeding  Pap:2015 neg    Kia Later is doing well  She has no concerns today  Has returned to work - so is breastfeeding and pumping  Has good stockpile in freezer  No pelvic pain  Using natural family planning and condoms/withdrawal       Has a pea sized lump in right breast - upper outer quadrant that comes and goes - not palpable today  Suspect related to milk ducts - if returns will call to order breast ultrasound  No issues with urination, leaking of urine  No bowel concerns  The following portions of the patient's history were reviewed and updated as appropriate: allergies, current medications, past family history, past medical history, past social history, past surgical history and problem list     Review of Systems   Gastrointestinal: Negative for abdominal pain, constipation and diarrhea  Genitourinary: Negative for difficulty urinating, dyspareunia, menstrual problem, pelvic pain, vaginal bleeding, vaginal discharge and vaginal pain  Objective:    /72   Ht 5' (1 524 m)   Wt 71 3 kg (157 lb 3 2 oz)   BMI 30 70 kg/m²      Physical Exam   Constitutional: She is oriented to person, place, and time  She appears well-developed and well-nourished  Pulmonary/Chest: No respiratory distress  Right breast exhibits no inverted nipple, no mass, no nipple discharge, no skin change and no tenderness   Left breast exhibits no inverted nipple, no mass, no nipple discharge, no skin change and no tenderness  Abdominal: Soft  There is no tenderness  Genitourinary: Uterus normal  There is no rash or lesion on the right labia  There is no rash or lesion on the left labia  Uterus is not enlarged and not tender  Cervix exhibits no motion tenderness and no discharge  Right adnexum displays no tenderness and no fullness  Left adnexum displays no tenderness and no fullness  No vaginal discharge found  Neurological: She is alert and oriented to person, place, and time  Skin: Skin is warm and dry  Psychiatric: She has a normal mood and affect  Vitals reviewed

## 2018-05-01 ENCOUNTER — ANNUAL EXAM (OUTPATIENT)
Dept: OBGYN CLINIC | Facility: CLINIC | Age: 33
End: 2018-05-01
Payer: COMMERCIAL

## 2018-05-01 VITALS
SYSTOLIC BLOOD PRESSURE: 100 MMHG | DIASTOLIC BLOOD PRESSURE: 72 MMHG | BODY MASS INDEX: 30.86 KG/M2 | WEIGHT: 157.2 LBS | HEIGHT: 60 IN

## 2018-05-01 DIAGNOSIS — Z01.419 ENCOUNTER FOR ANNUAL ROUTINE GYNECOLOGICAL EXAMINATION: Primary | ICD-10-CM

## 2018-05-01 PROCEDURE — G0145 SCR C/V CYTO,THINLAYER,RESCR: HCPCS | Performed by: PATHOLOGY

## 2018-05-01 PROCEDURE — 99395 PREV VISIT EST AGE 18-39: CPT | Performed by: OBSTETRICS & GYNECOLOGY

## 2018-05-01 PROCEDURE — G0124 SCREEN C/V THIN LAYER BY MD: HCPCS | Performed by: PATHOLOGY

## 2018-05-01 PROCEDURE — 87624 HPV HI-RISK TYP POOLED RSLT: CPT | Performed by: OBSTETRICS & GYNECOLOGY

## 2018-05-03 LAB — HPV RRNA GENITAL QL NAA+PROBE: NORMAL

## 2018-05-04 LAB
LAB AP GYN PRIMARY INTERPRETATION: NORMAL
Lab: NORMAL
PATH INTERP SPEC-IMP: NORMAL